# Patient Record
Sex: FEMALE | Race: WHITE | NOT HISPANIC OR LATINO | Employment: UNEMPLOYED | URBAN - METROPOLITAN AREA
[De-identification: names, ages, dates, MRNs, and addresses within clinical notes are randomized per-mention and may not be internally consistent; named-entity substitution may affect disease eponyms.]

---

## 2017-01-31 ENCOUNTER — HISTORICAL (OUTPATIENT)
Dept: RADIOLOGY | Facility: HOSPITAL | Age: 47
End: 2017-01-31

## 2017-02-03 ENCOUNTER — HISTORICAL (OUTPATIENT)
Dept: RADIOLOGY | Facility: HOSPITAL | Age: 47
End: 2017-02-03

## 2017-02-20 ENCOUNTER — HISTORICAL (OUTPATIENT)
Dept: RADIOLOGY | Facility: HOSPITAL | Age: 47
End: 2017-02-20

## 2017-03-15 ENCOUNTER — HISTORICAL (OUTPATIENT)
Dept: ADMINISTRATIVE | Facility: HOSPITAL | Age: 47
End: 2017-03-15

## 2017-03-16 ENCOUNTER — HISTORICAL (OUTPATIENT)
Dept: RADIOLOGY | Facility: HOSPITAL | Age: 47
End: 2017-03-16

## 2017-09-21 ENCOUNTER — HISTORICAL (OUTPATIENT)
Dept: RADIOLOGY | Facility: HOSPITAL | Age: 47
End: 2017-09-21

## 2017-10-26 ENCOUNTER — HISTORICAL (OUTPATIENT)
Dept: SURGERY | Facility: HOSPITAL | Age: 47
End: 2017-10-26

## 2017-11-21 ENCOUNTER — HISTORICAL (OUTPATIENT)
Dept: BARIATRICS | Facility: HOSPITAL | Age: 47
End: 2017-11-21

## 2017-11-27 ENCOUNTER — HISTORICAL (OUTPATIENT)
Dept: BARIATRICS | Facility: HOSPITAL | Age: 47
End: 2017-11-27

## 2017-11-27 ENCOUNTER — HISTORICAL (OUTPATIENT)
Dept: LAB | Facility: HOSPITAL | Age: 47
End: 2017-11-27

## 2017-11-27 LAB
ABS NEUT (OLG): 4.78 X10(3)/MCL (ref 2.1–9.2)
ALBUMIN SERPL-MCNC: 4.5 GM/DL (ref 3.4–5)
ALBUMIN/GLOB SERPL: 1.2 {RATIO}
ALP SERPL-CCNC: 139 UNIT/L (ref 38–126)
ALT SERPL-CCNC: 26 UNIT/L (ref 12–78)
APTT PPP: 29.8 SECOND(S) (ref 24.8–36.9)
AST SERPL-CCNC: 27 UNIT/L (ref 15–37)
BASOPHILS # BLD AUTO: 0 X10(3)/MCL (ref 0–0.2)
BASOPHILS NFR BLD AUTO: 1 %
BILIRUB SERPL-MCNC: 0.6 MG/DL (ref 0.2–1)
BILIRUBIN DIRECT+TOT PNL SERPL-MCNC: 0.1 MG/DL (ref 0–0.2)
BILIRUBIN DIRECT+TOT PNL SERPL-MCNC: 0.5 MG/DL (ref 0–0.8)
BUN SERPL-MCNC: 15 MG/DL (ref 7–18)
CALCIUM SERPL-MCNC: 9.7 MG/DL (ref 8.5–10.1)
CHLORIDE SERPL-SCNC: 101 MMOL/L (ref 98–107)
CO2 SERPL-SCNC: 27 MMOL/L (ref 21–32)
CREAT SERPL-MCNC: 0.71 MG/DL (ref 0.55–1.02)
EOSINOPHIL # BLD AUTO: 0.1 X10(3)/MCL (ref 0–0.9)
EOSINOPHIL NFR BLD AUTO: 1 %
ERYTHROCYTE [DISTWIDTH] IN BLOOD BY AUTOMATED COUNT: 14.6 % (ref 11.5–17)
GLOBULIN SER-MCNC: 3.8 GM/DL (ref 2.4–3.5)
GLUCOSE SERPL-MCNC: 79 MG/DL (ref 74–106)
HCT VFR BLD AUTO: 34.9 % (ref 37–47)
HGB BLD-MCNC: 10.6 GM/DL (ref 12–16)
LYMPHOCYTES # BLD AUTO: 1.8 X10(3)/MCL (ref 0.6–4.6)
LYMPHOCYTES NFR BLD AUTO: 24 %
MCH RBC QN AUTO: 23.4 PG (ref 27–31)
MCHC RBC AUTO-ENTMCNC: 30.4 GM/DL (ref 33–36)
MCV RBC AUTO: 77 FL (ref 80–94)
MONOCYTES # BLD AUTO: 0.8 X10(3)/MCL (ref 0.1–1.3)
MONOCYTES NFR BLD AUTO: 10 %
NEUTROPHILS # BLD AUTO: 4.78 X10(3)/MCL (ref 2.1–9.2)
NEUTROPHILS NFR BLD AUTO: 63 %
PLATELET # BLD AUTO: 266 X10(3)/MCL (ref 130–400)
PMV BLD AUTO: 10.2 FL (ref 9.4–12.4)
POTASSIUM SERPL-SCNC: 4.6 MMOL/L (ref 3.5–5.1)
PROT SERPL-MCNC: 8.3 GM/DL (ref 6.4–8.2)
RBC # BLD AUTO: 4.53 X10(6)/MCL (ref 4.2–5.4)
SODIUM SERPL-SCNC: 136 MMOL/L (ref 136–145)
WBC # SPEC AUTO: 7.5 X10(3)/MCL (ref 4.5–11.5)

## 2018-01-03 ENCOUNTER — HISTORICAL (OUTPATIENT)
Dept: BARIATRICS | Facility: HOSPITAL | Age: 48
End: 2018-01-03

## 2018-02-28 ENCOUNTER — HISTORICAL (OUTPATIENT)
Dept: BARIATRICS | Facility: HOSPITAL | Age: 48
End: 2018-02-28

## 2018-04-20 ENCOUNTER — HISTORICAL (OUTPATIENT)
Dept: BARIATRICS | Facility: HOSPITAL | Age: 48
End: 2018-04-20

## 2018-06-19 ENCOUNTER — HISTORICAL (OUTPATIENT)
Dept: BARIATRICS | Facility: HOSPITAL | Age: 48
End: 2018-06-19

## 2019-02-13 ENCOUNTER — HISTORICAL (OUTPATIENT)
Dept: BARIATRICS | Facility: HOSPITAL | Age: 49
End: 2019-02-13

## 2019-03-14 ENCOUNTER — HISTORICAL (OUTPATIENT)
Dept: SURGERY | Facility: HOSPITAL | Age: 49
End: 2019-03-14

## 2020-02-12 ENCOUNTER — HISTORICAL (OUTPATIENT)
Dept: BARIATRICS | Facility: HOSPITAL | Age: 50
End: 2020-02-12

## 2021-05-17 ENCOUNTER — HISTORICAL (OUTPATIENT)
Dept: RADIOLOGY | Facility: HOSPITAL | Age: 51
End: 2021-05-17

## 2021-10-28 ENCOUNTER — HISTORICAL (OUTPATIENT)
Dept: RADIOLOGY | Facility: HOSPITAL | Age: 51
End: 2021-10-28

## 2022-01-19 ENCOUNTER — HISTORICAL (OUTPATIENT)
Dept: RADIOLOGY | Facility: HOSPITAL | Age: 52
End: 2022-01-19

## 2022-01-25 ENCOUNTER — HISTORICAL (OUTPATIENT)
Dept: SURGERY | Facility: HOSPITAL | Age: 52
End: 2022-01-25

## 2022-01-26 ENCOUNTER — HISTORICAL (OUTPATIENT)
Dept: ANESTHESIOLOGY | Facility: HOSPITAL | Age: 52
End: 2022-01-26

## 2022-02-09 ENCOUNTER — HISTORICAL (OUTPATIENT)
Dept: ADMINISTRATIVE | Facility: HOSPITAL | Age: 52
End: 2022-02-09

## 2022-02-09 ENCOUNTER — HISTORICAL (OUTPATIENT)
Dept: RADIOLOGY | Facility: HOSPITAL | Age: 52
End: 2022-02-09

## 2022-03-30 ENCOUNTER — HISTORICAL (OUTPATIENT)
Dept: ADMINISTRATIVE | Facility: HOSPITAL | Age: 52
End: 2022-03-30

## 2022-03-30 ENCOUNTER — HISTORICAL (OUTPATIENT)
Dept: RADIOLOGY | Facility: HOSPITAL | Age: 52
End: 2022-03-30

## 2022-04-11 ENCOUNTER — HISTORICAL (OUTPATIENT)
Dept: ADMINISTRATIVE | Facility: HOSPITAL | Age: 52
End: 2022-04-11

## 2022-04-24 VITALS
SYSTOLIC BLOOD PRESSURE: 93 MMHG | BODY MASS INDEX: 27.28 KG/M2 | HEIGHT: 64 IN | DIASTOLIC BLOOD PRESSURE: 59 MMHG | WEIGHT: 159.81 LBS

## 2022-04-27 ENCOUNTER — HISTORICAL (OUTPATIENT)
Dept: ADMINISTRATIVE | Facility: HOSPITAL | Age: 52
End: 2022-04-27

## 2022-04-27 ENCOUNTER — HISTORICAL (OUTPATIENT)
Dept: RADIOLOGY | Facility: HOSPITAL | Age: 52
End: 2022-04-27

## 2022-04-30 NOTE — OP NOTE
Patient:   Jordan Gandara            MRN: [id number]            FIN: [id number]               Age:   46 years     Sex:  Female     :  1970   Associated Diagnoses:   None   Author:   Pal Casas MD        DATE OF SURGERY:    10/26/17    SURGEON:  Pal Casas MD    PREOPERATIVE DIAGNOSIS:  Gastroesophageal reflux disease.    POSTOPERATIVE DIAGNOSES:    Gastroesophageal reflux disease.     PROCEDURE:  Esophagogastroduodenoscopy with Biopsy.    ANESTHESIA:  IV Sedation    SPECIMENS REMOVED:  Antral Biopsy for bradly test    FINDINGS:  bradly test - Negative    INDICATIONS & SIGNIFICANT HISTORY:  The patient is a 46-year-old female who was being worked up for weight loss surgery.  The patient complained of gastroesophageal reflux disease.  Risks and benefits of elective endoscopy was discussed with the patient.  The patient voiced understanding of risks and benefits, and elected to proceed with surgery.    DESCRIPTION OF OPERATIVE PROCEDURE:  Once informed consents were obtained, the patient was taken to the Operating Room and placed supine on the operating table.  After MAC anesthesia was induced, the patient was then placed in left lateral decubitus position and the endoscope was used to enter the oropharynx down to the esophagus and into the stomach.  The stomach was then insufflated.  The scope was easily passed through the pylorus into the duodenum with no pathology appreciated.  The scope was then placed back into the stomach, and the antrum and body of the stomach appeared to have no significant pathology.  The scope was then retroflexed.  Upon visualization of the cardia and fundus, no pathology was appreciated.  The scope was then straightened.  An Antral biopsy was performed and tested for bradly with rapid bradly test.  No active bleeding was noted from biopsy site.  The insufflation was removed.  Upon visualization of the esophagus, no inflammatory process appreciated.  The Z-line appeared to  have no inflammatory changes.  The scope was then removed.  The patient tolerated the procedure well and was transported to Recovery Room in good condition.

## 2022-04-30 NOTE — OP NOTE
DATE OF SURGERY:    01/26/2022    SURGEON:  Britton Maya M.D.    PREOPERATIVE DIAGNOSIS:  Right distal radius fracture.    POSTOPERATIVE DIAGNOSIS:  Right distal radius fracture.    PROCEDURE:  Open reduction internal fixation right distal radius fracture.    IMPLANT TYPE:  Synthes volar plate locking.    INDICATION FOR PROCEDURE:  Ms. Gandara is 51, fell, sustained an intra-articular distal radius fracture.  She was brought to surgery, given a block by Anesthesia.  She is a chronic pain patient.  A volar incision was made.  The FCR tendon was retracted.  The fascia was incised beneath.  Flexor pollicis muscle was reflected off of the pronator.  The pronator was taken down sharply and the fracture was reduced.  It was secured with a volar locking plate.  Three screws were placed proximally, a cluster of locking pegs distally.  Reduction was satisfactory.  The wound was irrigated.  The pronator was closed with 2-0 Vicryl subcuticular with a running locking stitch.  Exofin glue on the skin.  Sterile dressing and volar splint applied.  No complications.        MAIDA/YASMIN   DD: 01/26/2022 1128   DT: 01/26/2022 1138  Job # [id number]

## 2022-04-30 NOTE — OP NOTE
Patient:   Jordan Gandara            MRN: [id number]            FIN: [id number]               Age:   48 years     Sex:  Female     :  1970   Associated Diagnoses:   None   Author:   Pal Casas MD        DATE OF SURGERY:    3/14/19    SURGEON:  Pal Casas MD    PREOPERATIVE DIAGNOSIS:  Epigastric Pain    POSTOPERATIVE DIAGNOSES:   1.  Yeast esophagitis        2.  Gastric Ulcer x 4      PROCEDURE:  Esophagogastroduodenoscopy with Antral Biopsy.    ANESTHESIA:  IV Sedation    SPECIMENS REMOVED:  Antral Biopsy for bradly test    FINDINGS:  As noted    INDICATIONS & SIGNIFICANT HISTORY:  The patient is a 48-year-old female who previously underwent sleeve gastrectomy for weight loss but started having some epigastric pain with meals.     Risks and benefits of elective endoscopy was discussed with the patient to evaluate for ulcer disease.  The patient voiced understanding of risks and benefits, and elected to proceed with surgery.    DESCRIPTION OF OPERATIVE PROCEDURE:  Once informed consents were obtained, the patient was taken to the Operating Room and placed supine on the operating table.  After MAC anesthesia was induced, the patient was then placed in left lateral decubitus position and the endoscope was used to enter the oropharynx down to the esophagus and into the stomach.  The esophagus appeared to have yeast coating the walls with some bile noted inthe stomach upon entry.  The stomach was then insufflated.  The scope was easily passed through the the stomach with sleeve anatomy appreciated.  There appeared to be 4 small non-bleeding ulcers noted along the greater curve (near the staple line).   The scope was then retroflexed.  Upon visualization of the cardia and fundus, no pathology was appreciated.  The scope was then straightened.  An Antral biopsy was performed and sent to pathology.  No active bleeding was noted from biopsy site.  The insufflation was removed.  Upon visualization  of the esophagus, no inflammatory process appreciated.  The Z-line appeared to have no inflammatory changes.  The scope was then removed.  The patient tolerated the procedure well and was transported to Recovery Room in good condition.

## 2023-04-12 ENCOUNTER — HOSPITAL ENCOUNTER (OUTPATIENT)
Dept: RADIOLOGY | Facility: HOSPITAL | Age: 53
Discharge: HOME | End: 2023-04-12
Attending: FAMILY MEDICINE
Payer: COMMERCIAL

## 2023-04-12 DIAGNOSIS — M25.562 PAIN IN LEFT KNEE: ICD-10-CM

## 2023-04-12 DIAGNOSIS — M25.562 LEFT KNEE PAIN: Primary | ICD-10-CM

## 2023-04-12 DIAGNOSIS — J06.9 ACUTE UPPER RESPIRATORY INFECTION: ICD-10-CM

## 2023-04-12 PROCEDURE — 71046 X-RAY EXAM CHEST 2 VIEWS: CPT | Mod: TC

## 2023-04-12 PROCEDURE — 73562 X-RAY EXAM OF KNEE 3: CPT | Mod: TC,LT

## 2023-04-13 ENCOUNTER — HOSPITAL ENCOUNTER (OUTPATIENT)
Dept: RADIOLOGY | Facility: HOSPITAL | Age: 53
Discharge: HOME | End: 2023-04-13
Attending: FAMILY MEDICINE
Payer: COMMERCIAL

## 2023-04-13 DIAGNOSIS — M25.562 PAIN IN LEFT KNEE: ICD-10-CM

## 2023-04-13 PROCEDURE — 93971 EXTREMITY STUDY: CPT | Mod: TC,LT

## 2023-04-14 DIAGNOSIS — M25.562 LEFT KNEE PAIN: Primary | ICD-10-CM

## 2023-05-09 RX ORDER — CYCLOBENZAPRINE HCL 10 MG
10 TABLET ORAL 3 TIMES DAILY
COMMUNITY
Start: 2023-03-23

## 2023-05-09 RX ORDER — DICYCLOMINE HYDROCHLORIDE 10 MG/1
10 CAPSULE ORAL
COMMUNITY
Start: 2023-03-27

## 2023-05-09 RX ORDER — CEVIMELINE HYDROCHLORIDE 30 MG/1
30 CAPSULE ORAL 3 TIMES DAILY
COMMUNITY
Start: 2023-04-16

## 2023-05-09 RX ORDER — TROSPIUM CHLORIDE 20 MG/1
20 TABLET, FILM COATED ORAL 2 TIMES DAILY
COMMUNITY
Start: 2023-02-28

## 2023-05-09 RX ORDER — PRAVASTATIN SODIUM 10 MG/1
10 TABLET ORAL NIGHTLY
COMMUNITY
Start: 2023-03-10

## 2023-05-09 RX ORDER — PANTOPRAZOLE SODIUM 40 MG/1
20 TABLET, DELAYED RELEASE ORAL 2 TIMES DAILY
COMMUNITY
Start: 2023-02-26

## 2023-05-09 RX ORDER — PROPRANOLOL HYDROCHLORIDE 20 MG/1
10 TABLET ORAL 2 TIMES DAILY
COMMUNITY
Start: 2023-03-07

## 2023-05-09 RX ORDER — SENNOSIDES 8.6 MG/1
17.2 TABLET ORAL NIGHTLY
COMMUNITY

## 2023-05-09 RX ORDER — DOCUSATE SODIUM 100 MG/1
100 CAPSULE, LIQUID FILLED ORAL 2 TIMES DAILY
COMMUNITY

## 2023-05-09 RX ORDER — HYDROCODONE BITARTRATE AND ACETAMINOPHEN 10; 325 MG/1; MG/1
1 TABLET ORAL EVERY 4 HOURS PRN
COMMUNITY
Start: 2023-03-23

## 2023-05-09 RX ORDER — METHADONE HYDROCHLORIDE 10 MG/1
10 TABLET ORAL 4 TIMES DAILY
COMMUNITY
Start: 2023-03-23

## 2023-05-09 RX ORDER — FUROSEMIDE 20 MG/1
20 TABLET ORAL EVERY MORNING
COMMUNITY
Start: 2023-04-17

## 2023-05-09 RX ORDER — LEVOTHYROXINE SODIUM 25 UG/1
25 TABLET ORAL
COMMUNITY
Start: 2023-02-13

## 2023-05-09 RX ORDER — BUSPIRONE HYDROCHLORIDE 30 MG/1
30 TABLET ORAL 2 TIMES DAILY
COMMUNITY
Start: 2023-03-23

## 2023-05-09 RX ORDER — ASPIRIN 81 MG/1
81 TABLET ORAL NIGHTLY
COMMUNITY

## 2023-05-09 RX ORDER — MULTIVITAMIN
1 TABLET ORAL 2 TIMES DAILY
COMMUNITY

## 2023-05-09 RX ORDER — ONDANSETRON 8 MG/1
8 TABLET, ORALLY DISINTEGRATING ORAL
COMMUNITY
Start: 2022-11-23

## 2023-05-09 RX ORDER — GABAPENTIN 800 MG/1
800 TABLET ORAL 3 TIMES DAILY
COMMUNITY
Start: 2023-03-23

## 2023-05-09 RX ORDER — TOPIRAMATE 50 MG/1
1 CAPSULE, EXTENDED RELEASE ORAL NIGHTLY
COMMUNITY
Start: 2023-03-27

## 2023-05-09 RX ORDER — SERTRALINE HYDROCHLORIDE 100 MG/1
100 TABLET, FILM COATED ORAL 2 TIMES DAILY
COMMUNITY
Start: 2023-03-23

## 2023-05-09 RX ORDER — PNV NO.95/FERROUS FUM/FOLIC AC 28MG-0.8MG
1 TABLET ORAL EVERY MORNING
COMMUNITY

## 2023-05-09 RX ORDER — CALCIUM CARBONATE 600 MG
600 TABLET ORAL 2 TIMES DAILY
COMMUNITY

## 2023-05-10 ENCOUNTER — ANESTHESIA EVENT (OUTPATIENT)
Dept: SURGERY | Facility: HOSPITAL | Age: 53
End: 2023-05-10
Payer: COMMERCIAL

## 2023-05-12 NOTE — H&P
"H&P Note will be under tab "Preprocedure Evaluation" Note completed by Anesthesiologist attending on day of MRI procedure.    "

## 2023-05-15 ENCOUNTER — HOSPITAL ENCOUNTER (OUTPATIENT)
Dept: RADIOLOGY | Facility: HOSPITAL | Age: 53
Discharge: HOME | End: 2023-05-15
Attending: FAMILY MEDICINE
Payer: COMMERCIAL

## 2023-05-15 ENCOUNTER — ANESTHESIA (OUTPATIENT)
Dept: SURGERY | Facility: HOSPITAL | Age: 53
End: 2023-05-15
Payer: COMMERCIAL

## 2023-05-15 ENCOUNTER — HOSPITAL ENCOUNTER (OUTPATIENT)
Facility: HOSPITAL | Age: 53
Discharge: HOME | End: 2023-05-15
Attending: ANESTHESIOLOGY | Admitting: ANESTHESIOLOGY
Payer: COMMERCIAL

## 2023-05-15 VITALS
HEIGHT: 64 IN | BODY MASS INDEX: 34.25 KG/M2 | OXYGEN SATURATION: 95 % | HEART RATE: 83 BPM | TEMPERATURE: 98 F | SYSTOLIC BLOOD PRESSURE: 124 MMHG | WEIGHT: 200.63 LBS | RESPIRATION RATE: 17 BRPM | DIASTOLIC BLOOD PRESSURE: 78 MMHG

## 2023-05-15 DIAGNOSIS — M25.562 LEFT KNEE PAIN: ICD-10-CM

## 2023-05-15 DIAGNOSIS — M25.562 LEFT KNEE PAIN, UNSPECIFIED CHRONICITY: ICD-10-CM

## 2023-05-15 LAB
B-HCG UR QL: NEGATIVE
CTP QC/QA: YES

## 2023-05-15 PROCEDURE — 63600175 PHARM REV CODE 636 W HCPCS: Performed by: NURSE ANESTHETIST, CERTIFIED REGISTERED

## 2023-05-15 PROCEDURE — D9220A PRA ANESTHESIA: Mod: CRNA,,, | Performed by: NURSE ANESTHETIST, CERTIFIED REGISTERED

## 2023-05-15 PROCEDURE — 25000003 PHARM REV CODE 250: Performed by: ANESTHESIOLOGY

## 2023-05-15 PROCEDURE — 81025 URINE PREGNANCY TEST: CPT | Performed by: ANESTHESIOLOGY

## 2023-05-15 PROCEDURE — 37000009 HC ANESTHESIA EA ADD 15 MINS: Performed by: ANESTHESIOLOGY

## 2023-05-15 PROCEDURE — D9220A PRA ANESTHESIA: ICD-10-PCS | Mod: ANES,,, | Performed by: ANESTHESIOLOGY

## 2023-05-15 PROCEDURE — 25000003 PHARM REV CODE 250: Performed by: NURSE ANESTHETIST, CERTIFIED REGISTERED

## 2023-05-15 PROCEDURE — D9220A PRA ANESTHESIA: ICD-10-PCS | Mod: CRNA,,, | Performed by: NURSE ANESTHETIST, CERTIFIED REGISTERED

## 2023-05-15 PROCEDURE — 37000008 HC ANESTHESIA 1ST 15 MINUTES: Performed by: ANESTHESIOLOGY

## 2023-05-15 PROCEDURE — 73721 MRI JNT OF LWR EXTRE W/O DYE: CPT | Mod: TC,LT

## 2023-05-15 PROCEDURE — D9220A PRA ANESTHESIA: Mod: ANES,,, | Performed by: ANESTHESIOLOGY

## 2023-05-15 RX ORDER — HYDROCODONE BITARTRATE AND ACETAMINOPHEN 10; 325 MG/1; MG/1
1 TABLET ORAL EVERY 6 HOURS PRN
Status: DISCONTINUED | OUTPATIENT
Start: 2023-05-15 | End: 2023-05-15 | Stop reason: HOSPADM

## 2023-05-15 RX ORDER — DIAZEPAM 5 MG/1
10 TABLET ORAL ONCE
Status: COMPLETED | OUTPATIENT
Start: 2023-05-15 | End: 2023-05-15

## 2023-05-15 RX ORDER — ONDANSETRON 2 MG/ML
INJECTION INTRAMUSCULAR; INTRAVENOUS
Status: DISCONTINUED | OUTPATIENT
Start: 2023-05-15 | End: 2023-05-15

## 2023-05-15 RX ORDER — SODIUM CHLORIDE, SODIUM GLUCONATE, SODIUM ACETATE, POTASSIUM CHLORIDE AND MAGNESIUM CHLORIDE 30; 37; 368; 526; 502 MG/100ML; MG/100ML; MG/100ML; MG/100ML; MG/100ML
INJECTION, SOLUTION INTRAVENOUS CONTINUOUS
Status: DISCONTINUED | OUTPATIENT
Start: 2023-05-15 | End: 2023-05-15 | Stop reason: HOSPADM

## 2023-05-15 RX ORDER — LIDOCAINE HYDROCHLORIDE 20 MG/ML
INJECTION, SOLUTION EPIDURAL; INFILTRATION; INTRACAUDAL; PERINEURAL
Status: DISCONTINUED | OUTPATIENT
Start: 2023-05-15 | End: 2023-05-15

## 2023-05-15 RX ORDER — DEXAMETHASONE SODIUM PHOSPHATE 4 MG/ML
INJECTION, SOLUTION INTRA-ARTICULAR; INTRALESIONAL; INTRAMUSCULAR; INTRAVENOUS; SOFT TISSUE
Status: DISCONTINUED | OUTPATIENT
Start: 2023-05-15 | End: 2023-05-15

## 2023-05-15 RX ORDER — LIDOCAINE HYDROCHLORIDE 10 MG/ML
1 INJECTION, SOLUTION EPIDURAL; INFILTRATION; INTRACAUDAL; PERINEURAL ONCE
Status: DISCONTINUED | OUTPATIENT
Start: 2023-05-15 | End: 2023-05-15 | Stop reason: HOSPADM

## 2023-05-15 RX ORDER — MIDAZOLAM HYDROCHLORIDE 1 MG/ML
INJECTION INTRAMUSCULAR; INTRAVENOUS
Status: DISCONTINUED | OUTPATIENT
Start: 2023-05-15 | End: 2023-05-15

## 2023-05-15 RX ORDER — PROPOFOL 10 MG/ML
INJECTION, EMULSION INTRAVENOUS
Status: DISCONTINUED | OUTPATIENT
Start: 2023-05-15 | End: 2023-05-15

## 2023-05-15 RX ORDER — ACETAMINOPHEN 10 MG/ML
1000 INJECTION, SOLUTION INTRAVENOUS ONCE
Status: CANCELLED | OUTPATIENT
Start: 2023-05-15 | End: 2023-05-15

## 2023-05-15 RX ORDER — DIPHENHYDRAMINE HYDROCHLORIDE 50 MG/ML
25 INJECTION INTRAMUSCULAR; INTRAVENOUS EVERY 6 HOURS PRN
Status: CANCELLED | OUTPATIENT
Start: 2023-05-15

## 2023-05-15 RX ORDER — ONDANSETRON 2 MG/ML
4 INJECTION INTRAMUSCULAR; INTRAVENOUS DAILY PRN
Status: CANCELLED | OUTPATIENT
Start: 2023-05-15

## 2023-05-15 RX ADMIN — DIAZEPAM 10 MG: 5 TABLET ORAL at 09:05

## 2023-05-15 RX ADMIN — HYDROCODONE BITARTRATE AND ACETAMINOPHEN 1 TABLET: 10; 325 TABLET ORAL at 09:05

## 2023-05-15 RX ADMIN — LIDOCAINE HYDROCHLORIDE 80 MG: 20 INJECTION, SOLUTION EPIDURAL; INFILTRATION; INTRACAUDAL; PERINEURAL at 12:05

## 2023-05-15 RX ADMIN — DEXAMETHASONE SODIUM PHOSPHATE 4 MG: 4 INJECTION, SOLUTION INTRA-ARTICULAR; INTRALESIONAL; INTRAMUSCULAR; INTRAVENOUS; SOFT TISSUE at 01:05

## 2023-05-15 RX ADMIN — ONDANSETRON 4 MG: 2 INJECTION INTRAMUSCULAR; INTRAVENOUS at 01:05

## 2023-05-15 RX ADMIN — MIDAZOLAM HYDROCHLORIDE 2 MG: 1 INJECTION, SOLUTION INTRAMUSCULAR; INTRAVENOUS at 12:05

## 2023-05-15 RX ADMIN — MIDAZOLAM HYDROCHLORIDE 3 MG: 1 INJECTION, SOLUTION INTRAMUSCULAR; INTRAVENOUS at 12:05

## 2023-05-15 RX ADMIN — PROPOFOL 150 MG: 10 INJECTION, EMULSION INTRAVENOUS at 12:05

## 2023-05-15 RX ADMIN — SODIUM CHLORIDE, SODIUM GLUCONATE, SODIUM ACETATE, POTASSIUM CHLORIDE AND MAGNESIUM CHLORIDE: 526; 502; 368; 37; 30 INJECTION, SOLUTION INTRAVENOUS at 12:05

## 2023-05-15 NOTE — ANESTHESIA POSTPROCEDURE EVALUATION
Anesthesia Post Evaluation    Patient: Jordan Gandara    Procedure(s) Performed: Procedure(s) (LRB):  MRI (Magnetic Resonance Imagine) (N/A)    Final Anesthesia Type: general      Patient location during evaluation: PACU  Patient participation: Yes- Able to Participate  Level of consciousness: oriented and awake  Post-procedure vital signs: reviewed and stable  Pain management: adequate  Airway patency: patent    PONV status at discharge: No PONV  Anesthetic complications: no      Cardiovascular status: hemodynamically stable  Respiratory status: spontaneous ventilation and unassisted  Hydration status: euvolemic  Follow-up not needed.  Comments: Merged with [facility]          Vitals Value Taken Time   /88 05/15/23 1341   Temp 36.2 °C (97.2 °F) 05/15/23 1315   Pulse 83 05/15/23 1344   Resp 15 05/15/23 1344   SpO2 91 % 05/15/23 1344   Vitals shown include unvalidated device data.      No case tracking events are documented in the log.      Pain/Susanna Score: Pain Rating Prior to Med Admin: 8 (5/15/2023  9:43 AM)  Susanna Score: 8 (5/15/2023  1:15 PM)

## 2023-05-15 NOTE — ANESTHESIA RELEASE NOTE
"Anesthesia Release from PACU Note    Patient: Jordan Gandara    Procedure(s) Performed: Procedure(s) (LRB):  MRI (Magnetic Resonance Imagine) (N/A)    Anesthesia type: general    Post pain: Adequate analgesia    Post assessment: no apparent anesthetic complications, tolerated procedure well and no evidence of recall    Last Vitals:   Visit Vitals  BP (!) 147/91 (BP Location: Left arm, Patient Position: Lying)   Pulse 91   Temp 36.4 °C (97.6 °F) (Oral)   Resp 16   Ht 5' 4" (1.626 m)   Wt 91 kg (200 lb 9.9 oz)   SpO2 97%   Breastfeeding No   BMI 34.44 kg/m²       Post vital signs: stable    Level of consciousness: awake, alert  and responds to stimulation    Nausea/Vomiting: no nausea/no vomiting    Complications: none    Airway Patency: patent    Respiratory: unassisted, spontaneous ventilation, nasal cannula    Cardiovascular: stable and blood pressure at baseline    Hydration: euvolemic  "

## 2023-05-15 NOTE — TRANSFER OF CARE
"Anesthesia Transfer of Care Note    Patient: Jordan Gandara    Procedure(s) Performed: Procedure(s) (LRB):  MRI (Magnetic Resonance Imagine) (N/A)    Patient location: PACU    Anesthesia Type: general    Transport from OR: Transported from OR on 2-3 L/min O2 by NC with adequate spontaneous ventilation    Post pain: adequate analgesia    Post assessment: no apparent anesthetic complications and tolerated procedure well    Post vital signs: stable    Level of consciousness: awake, alert and responds to stimulation    Nausea/Vomiting: no nausea/vomiting    Complications: none    Transfer of care protocol was followed      Last vitals:   Visit Vitals  BP (!) 147/91 (BP Location: Left arm, Patient Position: Lying)   Pulse 91   Temp 36.4 °C (97.6 °F) (Oral)   Resp 16   Ht 5' 4" (1.626 m)   Wt 91 kg (200 lb 9.9 oz)   SpO2 97%   Breastfeeding No   BMI 34.44 kg/m²     "

## 2023-05-15 NOTE — ANESTHESIA PREPROCEDURE EVALUATION
05/15/2023  Jordan Gandara is a 52 y.o. obese female with knee pain for MRI.  Unable to tolerate MRI without general anesthesia.  Chronic pain, opioid dependence, high anxiety, BRAULIO noted.  She is quite anxious in pre-op holding.      Pre-op Assessment    I have reviewed the Patient Summary Reports.     I have reviewed the Nursing Notes. I have reviewed the NPO Status.   I have reviewed the Medications.     Review of Systems  Anesthesia Hx:  No problems with previous Anesthesia  Denies Family Hx of Anesthesia complications.   Denies Personal Hx of Anesthesia complications.   Cardiovascular:  Cardiovascular Normal   Denies Angina.    Pulmonary:   Sleep Apnea    Hepatic/GI:   GERD    Endocrine:  Obesity / BMI > 30  Psych:   Psychiatric History          Physical Exam  General: Well nourished, Cooperative, Alert and Oriented    Airway:  Mallampati: II   Mouth Opening: Normal  TM Distance: Normal  Tongue: Normal  Neck ROM: Normal ROM    Dental:  Intact    Chest/Lungs:  Clear to auscultation, Normal Respiratory Rate    Heart:  Rate: Normal  Rhythm: Regular Rhythm        Anesthesia Plan  Type of Anesthesia, risks & benefits discussed:    Anesthesia Type: Gen Supraglottic Airway  Intra-op Monitoring Plan: Standard ASA Monitors  Post Op Pain Control Plan: multimodal analgesia  Induction:  IV  Airway Plan: , Post-Induction  Informed Consent: Informed consent signed with the Patient and all parties understand the risks and agree with anesthesia plan.  All questions answered.   ASA Score: 2  Day of Surgery Review of History & Physical: H&P Update referred to the surgeon/provider.    Ready For Surgery From Anesthesia Perspective.     .

## 2023-06-02 NOTE — DISCHARGE INSTRUCTIONS
-NO driving and NO alcohol consumption for 24 hours and while taking narcotic pain medications.      -Monitor sites for infection: redness, swelling, drainage/pus/foul odor, fever, chills.    -Report to your nearest ER if you experience and/or notify your provider if you experience any SUDDEN/SEVERE chest/abdominal pain, weakness, trouble breathing, uncontrolled pain.    Dr Jha ordered a year ago to see if helped her headaches and it did  Refill sent, I have seen her a couple of different times since.

## 2023-06-07 ENCOUNTER — ANESTHESIA EVENT (OUTPATIENT)
Dept: SURGERY | Facility: HOSPITAL | Age: 53
End: 2023-06-07
Payer: COMMERCIAL

## 2023-06-07 ENCOUNTER — HOSPITAL ENCOUNTER (OUTPATIENT)
Dept: RADIOLOGY | Facility: HOSPITAL | Age: 53
Discharge: HOME | End: 2023-06-07
Attending: SPECIALIST
Payer: COMMERCIAL

## 2023-06-07 ENCOUNTER — CLINICAL SUPPORT (OUTPATIENT)
Dept: RESPIRATORY THERAPY | Facility: HOSPITAL | Age: 53
End: 2023-06-07
Attending: SPECIALIST
Payer: COMMERCIAL

## 2023-06-07 DIAGNOSIS — M23.342 MENISCUS, LATERAL, ANTERIOR HORN DERANGEMENT, LEFT: Primary | ICD-10-CM

## 2023-06-07 DIAGNOSIS — Z01.811 PRE-OP CHEST EXAM: ICD-10-CM

## 2023-06-07 DIAGNOSIS — M23.342 MENISCUS, LATERAL, ANTERIOR HORN DERANGEMENT, LEFT: ICD-10-CM

## 2023-06-07 PROCEDURE — 93010 EKG 12-LEAD: ICD-10-PCS | Mod: ,,, | Performed by: INTERNAL MEDICINE

## 2023-06-07 PROCEDURE — 71046 X-RAY EXAM CHEST 2 VIEWS: CPT | Mod: TC

## 2023-06-07 PROCEDURE — 93010 ELECTROCARDIOGRAM REPORT: CPT | Mod: ,,, | Performed by: INTERNAL MEDICINE

## 2023-06-07 PROCEDURE — 93005 ELECTROCARDIOGRAM TRACING: CPT

## 2023-06-07 NOTE — ANESTHESIA PREPROCEDURE EVALUATION
06/07/2023  Jordan Gandara is a 52 y.o., female.      Pre-op Assessment    I have reviewed the Patient Summary Reports.     I have reviewed the Nursing Notes. I have reviewed the NPO Status.   I have reviewed the Medications.     Review of Systems  Anesthesia Hx:  Denies Family Hx of Anesthesia complications.   Denies Personal Hx of Anesthesia complications.   Social:  Non-Smoker, No Alcohol Use    Hematology/Oncology:  Hematology Normal        EENT/Dental:EENT/Dental Normal   Cardiovascular:   ECG has been reviewed.    Pulmonary:  Pulmonary Normal    Renal/:  Renal/ Normal     Hepatic/GI:   GERD, well controlled    Musculoskeletal:  Musculoskeletal Normal    Neurological:  Neurology Normal    Endocrine:  Endocrine Normal    Dermatological:  Skin Normal    Psych:   Psychiatric History          Physical Exam  General: Cooperative, Alert and Oriented  obesity  Airway:  Mallampati: II   Mouth Opening: Normal  TM Distance: Normal  Tongue: Normal  Neck ROM: Normal ROM    Dental:  Intact        Anesthesia Plan  Type of Anesthesia, risks & benefits discussed:    Anesthesia Type: Gen Supraglottic Airway  Intra-op Monitoring Plan: Standard ASA Monitors  Post Op Pain Control Plan: multimodal analgesia  Induction:  IV  Informed Consent: Informed consent signed with the Patient and all parties understand the risks and agree with anesthesia plan.  All questions answered. Patient consented to blood products? Yes  ASA Score: 1    Ready For Surgery From Anesthesia Perspective.     .

## 2023-06-07 NOTE — DISCHARGE INSTRUCTIONS
Nothing to eat or drink after midnight.       POST-OP INSTRUCTIONS FOR KNEE ARTHROSCOPY    Britton Marin M.D.     Orthopedic Surgery/Sports Medicine     [address], [facility]. [facility]     [place] [address] [id number]     Office: [phone]               REMOVE ACE BANDAGE AND DRESSING FROM KNEE 2 DAYS AFTER SURGERY, REMOVE ACE SOONER   IN CASE OF EXCESSIVE PAIN OR SWELLING OF LOWER EXTREMITY.    2. APPLY ICE FOR FIRST FEW DAYS AT 20 MINUTE INTERVALS.    3. USE CRUTCHES AS NEEDED. YOU MAY SLOWLY BEGIN TO PUT MORE WEIGHT   ON YOUR KNEE AS YOU FEEL COMFORTABLE TO DO SO.     4. AFTER THE 2 DAYS, WASH INCISION DAILY WITH ANTIBACTERIAL SOAP AND WATER   AND APPLY BANDAIDS OVER INCISION SITES.   WHEN INCISIONS STOP DRAINING YOU CAN LEAVE OPEN TO AIR.    5. REFRAIN FROM USING HYDROGEN PEROXIDE OR ANTIBIOTIC OINTMENT UNLESS INSTRUCTED TO DO SO.    6.  DO NOT SOAK INCISION IN BATHTUB.    7. CALL DR MARIN OR STAFF IN CASE OF REDNESS, EXCESSIVE DRAINAGE   OR INCREASE IN TEMPERATURE ABOVE 101.5.

## 2023-06-08 ENCOUNTER — HOSPITAL ENCOUNTER (OUTPATIENT)
Facility: HOSPITAL | Age: 53
Discharge: HOME | End: 2023-06-08
Attending: SPECIALIST | Admitting: SPECIALIST
Payer: COMMERCIAL

## 2023-06-08 ENCOUNTER — ANESTHESIA (OUTPATIENT)
Dept: SURGERY | Facility: HOSPITAL | Age: 53
End: 2023-06-08
Payer: COMMERCIAL

## 2023-06-08 VITALS
SYSTOLIC BLOOD PRESSURE: 120 MMHG | BODY MASS INDEX: 34.84 KG/M2 | DIASTOLIC BLOOD PRESSURE: 57 MMHG | WEIGHT: 203 LBS | RESPIRATION RATE: 14 BRPM | TEMPERATURE: 98 F | OXYGEN SATURATION: 96 % | HEART RATE: 67 BPM

## 2023-06-08 DIAGNOSIS — S83.282A ACUTE LATERAL MENISCUS TEAR OF LEFT KNEE: ICD-10-CM

## 2023-06-08 PROCEDURE — 63600175 PHARM REV CODE 636 W HCPCS: Performed by: SPECIALIST

## 2023-06-08 PROCEDURE — 36000711: Performed by: SPECIALIST

## 2023-06-08 PROCEDURE — 71000033 HC RECOVERY, INTIAL HOUR: Performed by: SPECIALIST

## 2023-06-08 PROCEDURE — 25000003 PHARM REV CODE 250: Performed by: SPECIALIST

## 2023-06-08 PROCEDURE — 27201423 OPTIME MED/SURG SUP & DEVICES STERILE SUPPLY: Performed by: SPECIALIST

## 2023-06-08 PROCEDURE — 36000710: Performed by: SPECIALIST

## 2023-06-08 PROCEDURE — D9220A PRA ANESTHESIA: ICD-10-PCS | Mod: ,,, | Performed by: NURSE ANESTHETIST, CERTIFIED REGISTERED

## 2023-06-08 PROCEDURE — 71000016 HC POSTOP RECOV ADDL HR: Performed by: SPECIALIST

## 2023-06-08 PROCEDURE — D9220A PRA ANESTHESIA: Mod: ,,, | Performed by: NURSE ANESTHETIST, CERTIFIED REGISTERED

## 2023-06-08 PROCEDURE — 63600175 PHARM REV CODE 636 W HCPCS: Performed by: NURSE ANESTHETIST, CERTIFIED REGISTERED

## 2023-06-08 PROCEDURE — 25000003 PHARM REV CODE 250: Performed by: NURSE ANESTHETIST, CERTIFIED REGISTERED

## 2023-06-08 PROCEDURE — 25000003 PHARM REV CODE 250: Performed by: ANESTHESIOLOGY

## 2023-06-08 PROCEDURE — 63600175 PHARM REV CODE 636 W HCPCS: Performed by: ANESTHESIOLOGY

## 2023-06-08 PROCEDURE — 71000015 HC POSTOP RECOV 1ST HR: Performed by: SPECIALIST

## 2023-06-08 PROCEDURE — 63600175 PHARM REV CODE 636 W HCPCS

## 2023-06-08 PROCEDURE — 37000009 HC ANESTHESIA EA ADD 15 MINS: Performed by: SPECIALIST

## 2023-06-08 PROCEDURE — 37000008 HC ANESTHESIA 1ST 15 MINUTES: Performed by: SPECIALIST

## 2023-06-08 RX ORDER — TRAMADOL HYDROCHLORIDE 50 MG/1
50 TABLET ORAL
Status: COMPLETED | OUTPATIENT
Start: 2023-06-08 | End: 2023-06-08

## 2023-06-08 RX ORDER — CEFAZOLIN SODIUM 2 G/50ML
2 SOLUTION INTRAVENOUS
Status: COMPLETED | OUTPATIENT
Start: 2023-06-08 | End: 2023-06-08

## 2023-06-08 RX ORDER — SODIUM CHLORIDE 0.9 % (FLUSH) 0.9 %
10 SYRINGE (ML) INJECTION
Status: DISCONTINUED | OUTPATIENT
Start: 2023-06-08 | End: 2023-06-08 | Stop reason: HOSPADM

## 2023-06-08 RX ORDER — CEFADROXIL 500 MG/1
500 CAPSULE ORAL EVERY 12 HOURS
Qty: 20 CAPSULE | Refills: 0 | Status: SHIPPED | OUTPATIENT
Start: 2023-06-08 | End: 2023-06-18

## 2023-06-08 RX ORDER — SODIUM CHLORIDE, SODIUM LACTATE, POTASSIUM CHLORIDE, CALCIUM CHLORIDE 600; 310; 30; 20 MG/100ML; MG/100ML; MG/100ML; MG/100ML
INJECTION, SOLUTION INTRAVENOUS CONTINUOUS
Status: DISCONTINUED | OUTPATIENT
Start: 2023-06-08 | End: 2023-06-08 | Stop reason: HOSPADM

## 2023-06-08 RX ORDER — TRIAMCINOLONE ACETONIDE 40 MG/ML
INJECTION, SUSPENSION INTRA-ARTICULAR; INTRAMUSCULAR
Status: DISCONTINUED | OUTPATIENT
Start: 2023-06-08 | End: 2023-06-08 | Stop reason: HOSPADM

## 2023-06-08 RX ORDER — FENTANYL CITRATE 50 UG/ML
INJECTION, SOLUTION INTRAMUSCULAR; INTRAVENOUS
Status: DISCONTINUED | OUTPATIENT
Start: 2023-06-08 | End: 2023-06-08

## 2023-06-08 RX ORDER — ONDANSETRON 2 MG/ML
INJECTION INTRAMUSCULAR; INTRAVENOUS
Status: DISCONTINUED | OUTPATIENT
Start: 2023-06-08 | End: 2023-06-08

## 2023-06-08 RX ORDER — DEXAMETHASONE SODIUM PHOSPHATE 4 MG/ML
INJECTION, SOLUTION INTRA-ARTICULAR; INTRALESIONAL; INTRAMUSCULAR; INTRAVENOUS; SOFT TISSUE
Status: DISCONTINUED | OUTPATIENT
Start: 2023-06-08 | End: 2023-06-08

## 2023-06-08 RX ORDER — BUPIVACAINE HYDROCHLORIDE 2.5 MG/ML
INJECTION, SOLUTION EPIDURAL; INFILTRATION; INTRACAUDAL
Status: DISCONTINUED | OUTPATIENT
Start: 2023-06-08 | End: 2023-06-08 | Stop reason: HOSPADM

## 2023-06-08 RX ORDER — HYDROCODONE BITARTRATE AND ACETAMINOPHEN 10; 325 MG/1; MG/1
1 TABLET ORAL EVERY 6 HOURS PRN
Qty: 28 TABLET | Refills: 0 | Status: SHIPPED | OUTPATIENT
Start: 2023-06-08

## 2023-06-08 RX ORDER — LIDOCAINE HYDROCHLORIDE 20 MG/ML
INJECTION INTRAVENOUS
Status: DISCONTINUED | OUTPATIENT
Start: 2023-06-08 | End: 2023-06-08

## 2023-06-08 RX ORDER — PROPOFOL 10 MG/ML
INJECTION, EMULSION INTRAVENOUS
Status: DISCONTINUED | OUTPATIENT
Start: 2023-06-08 | End: 2023-06-08

## 2023-06-08 RX ORDER — ACETAMINOPHEN 500 MG
1000 TABLET ORAL
Status: COMPLETED | OUTPATIENT
Start: 2023-06-08 | End: 2023-06-08

## 2023-06-08 RX ORDER — HYDROMORPHONE HYDROCHLORIDE 2 MG/ML
1 INJECTION, SOLUTION INTRAMUSCULAR; INTRAVENOUS; SUBCUTANEOUS
Status: COMPLETED | OUTPATIENT
Start: 2023-06-08 | End: 2023-06-08

## 2023-06-08 RX ORDER — MIDAZOLAM HYDROCHLORIDE 1 MG/ML
INJECTION INTRAMUSCULAR; INTRAVENOUS
Status: DISCONTINUED | OUTPATIENT
Start: 2023-06-08 | End: 2023-06-08

## 2023-06-08 RX ORDER — DIAZEPAM 5 MG/1
10 TABLET ORAL
Status: COMPLETED | OUTPATIENT
Start: 2023-06-08 | End: 2023-06-08

## 2023-06-08 RX ORDER — GABAPENTIN 300 MG/1
600 CAPSULE ORAL
Status: COMPLETED | OUTPATIENT
Start: 2023-06-08 | End: 2023-06-08

## 2023-06-08 RX ADMIN — ACETAMINOPHEN 1000 MG: 500 TABLET, FILM COATED ORAL at 07:06

## 2023-06-08 RX ADMIN — HYDROMORPHONE HYDROCHLORIDE 1 MG: 2 INJECTION, SOLUTION INTRAMUSCULAR; INTRAVENOUS; SUBCUTANEOUS at 07:06

## 2023-06-08 RX ADMIN — LIDOCAINE HYDROCHLORIDE 100 MG: 20 INJECTION, SOLUTION INTRAVENOUS at 08:06

## 2023-06-08 RX ADMIN — CEFAZOLIN SODIUM 2 G: 2 SOLUTION INTRAVENOUS at 08:06

## 2023-06-08 RX ADMIN — ONDANSETRON 4 MG: 2 INJECTION INTRAMUSCULAR; INTRAVENOUS at 08:06

## 2023-06-08 RX ADMIN — MIDAZOLAM HYDROCHLORIDE 2 MG: 1 INJECTION INTRAMUSCULAR; INTRAVENOUS at 07:06

## 2023-06-08 RX ADMIN — FENTANYL CITRATE 50 MCG: 50 INJECTION, SOLUTION INTRAMUSCULAR; INTRAVENOUS at 07:06

## 2023-06-08 RX ADMIN — TRAMADOL HYDROCHLORIDE 50 MG: 50 TABLET, FILM COATED ORAL at 07:06

## 2023-06-08 RX ADMIN — DEXAMETHASONE SODIUM PHOSPHATE 4 MG: 4 INJECTION, SOLUTION INTRA-ARTICULAR; INTRALESIONAL; INTRAMUSCULAR; INTRAVENOUS; SOFT TISSUE at 08:06

## 2023-06-08 RX ADMIN — PROPOFOL 120 MG: 10 INJECTION, EMULSION INTRAVENOUS at 08:06

## 2023-06-08 RX ADMIN — GABAPENTIN 600 MG: 300 CAPSULE ORAL at 07:06

## 2023-06-08 RX ADMIN — SODIUM CHLORIDE, POTASSIUM CHLORIDE, SODIUM LACTATE AND CALCIUM CHLORIDE: 600; 310; 30; 20 INJECTION, SOLUTION INTRAVENOUS at 08:06

## 2023-06-08 RX ADMIN — DIAZEPAM 10 MG: 5 TABLET ORAL at 07:06

## 2023-06-08 RX ADMIN — SODIUM CHLORIDE, POTASSIUM CHLORIDE, SODIUM LACTATE AND CALCIUM CHLORIDE: 600; 310; 30; 20 INJECTION, SOLUTION INTRAVENOUS at 07:06

## 2023-06-08 NOTE — HOSPITAL COURSE
Patient underwent arthroscopic partial lateral meniscectomy for a torn discoid lateral meniscus.  When tolerating p.o. she will be discharged home

## 2023-06-08 NOTE — PATIENT INSTRUCTIONS
Patient needs to be instructed to use her cryo Cuff 2 hours in the morning and 2 hours in the afternoon.  She can weightbear as tolerated to the left knee.  Dressing change in 3 days.

## 2023-06-08 NOTE — DISCHARGE SUMMARY
[facility] - Periop Services  Discharge Note  Short Stay    Procedure(s) (LRB):  ARTHROSCOPY, KNEE, WITH MENISCECTOMY (LMT) (Left)      OUTCOME: Condition has improved and patient is now ready for discharge.    DISPOSITION: Home or Self Care    FINAL DIAGNOSIS:  <principal problem not specified>    FOLLOWUP: In clinic    DISCHARGE INSTRUCTIONS:  No discharge procedures on file.     TIME SPENT ON DISCHARGE: 25 minutes

## 2023-06-08 NOTE — TRANSFER OF CARE
Anesthesia Transfer of Care Note    Patient: Jordan Gandara    Procedure(s) Performed: Procedure(s) (LRB):  ARTHROSCOPY, KNEE, WITH MENISCECTOMY (LMT) (Left)    Patient location: PACU    Anesthesia Type: general    Transport from OR: Transported from OR on room air with adequate spontaneous ventilation    Post pain: adequate analgesia    Post assessment: no apparent anesthetic complications    Post vital signs: stable    Level of consciousness: responds to stimulation and sedated    Nausea/Vomiting: no nausea/vomiting    Complications: none    Transfer of care protocol was followed      Last vitals:   Visit Vitals  /65   Pulse 75   Temp 36.6 °C (97.8 °F) (Oral)   Resp 20   Wt 92.1 kg (203 lb)   LMP 05/15/2023   SpO2 (!) 93%   Breastfeeding No   BMI 34.84 kg/m²

## 2023-06-08 NOTE — DISCHARGE SUMMARY
[facility] - Peri Services  Orthopedics  Discharge Summary      Patient Name: Jordan Gandara  MRN: [id number]  Admission Date: 6/8/2023  Hospital Length of Stay: 0 days  Discharge Date and Time:  06/08/2023 8:59 AM  Attending Physician: Britton Maya MD   Discharging Provider: Britton Maya MD  Primary Care Provider: Hanna Benitez MD    HPI:   No notes on file    Procedure(s) (LRB):  ARTHROSCOPY, KNEE, WITH MENISCECTOMY (LMT) (Left)      Hospital Course:  Patient underwent arthroscopic partial lateral meniscectomy for a torn discoid lateral meniscus.  When tolerating p.o. she will be discharged home      Goals of Care Treatment Preferences:             Significant Diagnostic Studies: none    Pending Diagnostic Studies:     None        Final Active Diagnoses:    Diagnosis Date Noted POA    PRINCIPAL PROBLEM:  Acute lateral meniscus tear of left knee [S83.282A] 06/08/2023 Yes      Problems Resolved During this Admission:      Discharged Condition: stable    Disposition: Home or Self Care    Follow Up:   Follow-up Information     Britton Maya MD. Schedule an appointment as soon as possible for a visit on 6/14/2023.    Specialty: Orthopedic Surgery  Contact information:  [address] [place] [address] [id number] [phone]                       Patient Instructions:   No discharge procedures on file.  Medications:  Reconciled Home Medications:      Medication List      START taking these medications    cefadroxil 500 MG Cap  Commonly known as: DURICEF  Take 1 capsule (500 mg total) by mouth every 12 (twelve) hours. for 10 days        CHANGE how you take these medications    * HYDROcodone-acetaminophen  mg per tablet  Commonly known as: NORCO  1 tablet every 4 (four) hours as needed.  What changed: Another medication with the same name was added. Make sure you understand how and when to take each.     * HYDROcodone-acetaminophen  mg per tablet  Commonly known as: NORCO  Take 1  tablet by mouth every 6 (six) hours as needed for Pain.  What changed: You were already taking a medication with the same name, and this prescription was added. Make sure you understand how and when to take each.         * This list has 2 medication(s) that are the same as other medications prescribed for you. Read the directions carefully, and ask your doctor or other care provider to review them with you.            CONTINUE taking these medications    aspirin 81 MG EC tablet  Commonly known as: ECOTRIN  Take 81 mg by mouth every evening. Stopped 6/7/23     biotin-keratin 10,000-100 mcg-mg Tab  Take 1 capsule by mouth every morning.     busPIRone 30 MG Tab  Commonly known as: BUSPAR  Take 30 mg by mouth 2 (two) times daily.     calcium carbonate 600 mg calcium (1,500 mg) Tab  Commonly known as: OS-HANNAH  Take 600 mg by mouth 2 (two) times a day.     cevimeline 30 mg capsule  Commonly known as: EVOXAC  Take 30 mg by mouth 3 (three) times daily.     cyanocobalamin 100 MCG tablet  Commonly known as: VITAMIN B-12  Take 1 capsule by mouth every morning.     cyclobenzaprine 10 MG tablet  Commonly known as: FLEXERIL  Take 10 mg by mouth 3 (three) times daily.     dicyclomine 10 MG capsule  Commonly known as: BENTYL  Take 10 mg by mouth as needed.     docusate sodium 100 MG capsule  Commonly known as: COLACE  Take 100 mg by mouth 2 (two) times daily.     furosemide 20 MG tablet  Commonly known as: LASIX  Take 20 mg by mouth every morning.     gabapentin 800 MG tablet  Commonly known as: NEURONTIN  Take 800 mg by mouth 3 (three) times daily.     levothyroxine 25 MCG tablet  Commonly known as: SYNTHROID  Take 25 mcg by mouth before breakfast.     methadone 10 MG tablet  Commonly known as: DOLOPHINE  Take 10 mg by mouth 4 (four) times daily.     multivitamin per tablet  Commonly known as: THERAGRAN  Take 1 tablet by mouth 2 (two) times a day.     ondansetron 8 MG Tbdl  Commonly known as: ZOFRAN-ODT  8 mg as needed.      pantoprazole 40 MG tablet  Commonly known as: PROTONIX  Take 20 mg by mouth 2 (two) times a day.     pravastatin 10 MG tablet  Commonly known as: PRAVACHOL  Take 10 mg by mouth every evening.     propranoloL 20 MG tablet  Commonly known as: INDERAL  Take 10 mg by mouth 2 (two) times daily.     senna 8.6 mg tablet  Commonly known as: SENOKOT  Take 17.2 mg by mouth every evening.     sertraline 100 MG tablet  Commonly known as: ZOLOFT  Take 100 mg by mouth 2 (two) times a day.     topiramate 50 mg Cp24  Commonly known as: TROKENDI XR  Take 1 capsule by mouth every evening.     trospium 20 mg Tab tablet  Commonly known as: sanctura  Take 20 mg by mouth 2 (two) times daily.            Britton Maya MD  Orthopedics  [facility] - Periop Services

## 2023-06-08 NOTE — ANESTHESIA POSTPROCEDURE EVALUATION
Anesthesia Post Evaluation    Patient: Jordan Gandara    Procedure(s) Performed: Procedure(s) (LRB):  ARTHROSCOPY, KNEE, WITH MENISCECTOMY (LMT) (Left)    Final Anesthesia Type: general      Patient location during evaluation: PACU  Patient participation: Yes- Able to Participate  Level of consciousness: awake  Post-procedure vital signs: reviewed and stable  Pain management: adequate  Airway patency: patent  BRAULIO mitigation strategies: Multimodal analgesia  PONV status at discharge: No PONV  Anesthetic complications: no      Cardiovascular status: hemodynamically stable  Respiratory status: unassisted, room air and spontaneous ventilation  Hydration status: euvolemic  Follow-up not needed.          Vitals Value Taken Time   /86 06/08/23 0910   Temp 36.4 °C (97.5 °F) 06/08/23 0905   Pulse 75 06/08/23 0913   Resp 10 06/08/23 0913   SpO2 96 % 06/08/23 0913   Vitals shown include unvalidated device data.      No case tracking events are documented in the log.      Pain/Susanna Score: Pain Rating Prior to Med Admin: 8 (6/8/2023  7:12 AM)  Susanna Score: 5 (6/8/2023  9:04 AM)

## 2023-06-08 NOTE — OP NOTE
Operative note     Date: 6/8/2023     Preop diagnosis:  Lateral meniscal tear left knee    Postop diagnosis torn discoid lateral meniscus left knee    Procedure:  Arthroscopic partial lateral meniscectomy    Surgeon: Britton Maya M.D.    Assistant: .    Anesthesia:  General    Complications: none    Blood loss: nil    Procedure in detail:  Informed consent was obtained.  Risks of the procedure was explained not excluding infection, bleeding pain, scarring, neurovascular injury, arthritis.  This patient was 52 with persistent knee pain despite conservative management.  Her MRI indicated extruded torn lateral meniscus.  She was brought to the OR given IV antibiotics and general anesthesia.  Inflow was established and the knee was examined arthroscopically.  She would a moderate effusion.  Her medial joint line was pristine her anterior and posterior cruciate ligaments were intact.  Patellofemoral had some synovitis but no significant arthritic disease.  She would a large discoid meniscus laterally with extruded tear anteriorly as well as a posterior tear.  I resected the anterior portion of the discoid meniscus was torn and used a motorized shaver posteriorly for debridement to a stable rim.  She did have some chondral scuffing.  The knee was irrigated.  The portals were closed with nylon sterile dressing applied.  No complications    Britton Maya M.D.

## 2023-06-08 NOTE — ANESTHESIA PROCEDURE NOTES
IGEL 4 Insertion    Date/Time: 6/8/2023 8:04 AM  Performed by: Terri Hamilton CRNA  Authorized by: Sandro Ortega DO     Intubation:     Induction:  Intravenous    Mask Ventilation:  N/a    Attempts:  1    Attempted By:  CRNA    Difficult Airway Encountered?: No      Complications:  None    Airway Device:  Supraglottic airway/LMA (IGEL)    Airway Device Size:  4.0    Style/Cuff Inflation:  Uncuffed    Placement Verified By:  Capnometry    Complicating Factors:  None    Findings Post-Intubation:  BS equal bilateral and atraumatic/condition of teeth unchanged

## 2023-06-23 ENCOUNTER — HOSPITAL ENCOUNTER (OUTPATIENT)
Dept: RADIOLOGY | Facility: HOSPITAL | Age: 53
Discharge: HOME | End: 2023-06-23
Attending: FAMILY MEDICINE
Payer: COMMERCIAL

## 2023-06-23 DIAGNOSIS — M25.511 RIGHT SHOULDER PAIN: ICD-10-CM

## 2023-06-23 PROCEDURE — 73030 X-RAY EXAM OF SHOULDER: CPT | Mod: TC,RT

## 2023-11-24 ENCOUNTER — TELEPHONE (OUTPATIENT)
Dept: SURGERY | Facility: CLINIC | Age: 53
End: 2023-11-24
Payer: COMMERCIAL

## 2023-12-04 ENCOUNTER — TELEPHONE (OUTPATIENT)
Dept: SURGERY | Facility: CLINIC | Age: 53
End: 2023-12-04
Payer: COMMERCIAL

## 2024-05-14 ENCOUNTER — HOSPITAL ENCOUNTER (OUTPATIENT)
Dept: RADIOLOGY | Facility: HOSPITAL | Age: 54
Discharge: HOME | End: 2024-05-14
Attending: FAMILY MEDICINE
Payer: COMMERCIAL

## 2024-05-14 DIAGNOSIS — R05.1 ACUTE COUGH: ICD-10-CM

## 2024-05-14 PROCEDURE — 71046 X-RAY EXAM CHEST 2 VIEWS: CPT | Mod: TC

## 2024-05-15 DIAGNOSIS — R10.84 ABDOMINAL PAIN, GENERALIZED: Primary | ICD-10-CM

## 2024-06-05 ENCOUNTER — HOSPITAL ENCOUNTER (OUTPATIENT)
Dept: RADIOLOGY | Facility: HOSPITAL | Age: 54
Discharge: HOME | End: 2024-06-05
Attending: FAMILY MEDICINE
Payer: COMMERCIAL

## 2024-06-05 DIAGNOSIS — K59.00 CONSTIPATED: ICD-10-CM

## 2024-06-05 DIAGNOSIS — R10.84 ABDOMINAL PAIN, GENERALIZED: ICD-10-CM

## 2024-06-07 ENCOUNTER — HOSPITAL ENCOUNTER (OUTPATIENT)
Dept: RADIOLOGY | Facility: HOSPITAL | Age: 54
Discharge: HOME | End: 2024-06-07
Attending: FAMILY MEDICINE
Payer: COMMERCIAL

## 2024-06-07 PROCEDURE — 74022 RADEX COMPL AQT ABD SERIES: CPT | Mod: TC

## 2024-08-14 ENCOUNTER — HOSPITAL ENCOUNTER (INPATIENT)
Facility: HOSPITAL | Age: 54
LOS: 3 days | Discharge: HOME | DRG: 880 | End: 2024-08-17
Attending: EMERGENCY MEDICINE | Admitting: INTERNAL MEDICINE
Payer: COMMERCIAL

## 2024-08-14 DIAGNOSIS — R25.1 EPISODE OF SHAKING: ICD-10-CM

## 2024-08-14 DIAGNOSIS — R07.9 CHEST PAIN: ICD-10-CM

## 2024-08-14 DIAGNOSIS — G40.909 SEIZURE DISORDER: ICD-10-CM

## 2024-08-14 DIAGNOSIS — R90.89 ABNORMAL CT OF BRAIN: ICD-10-CM

## 2024-08-14 DIAGNOSIS — R56.9 SEIZURE-LIKE ACTIVITY: Primary | ICD-10-CM

## 2024-08-14 LAB
ALBUMIN SERPL-MCNC: 4 G/DL (ref 3.5–5)
ALBUMIN/GLOB SERPL: 1.1 RATIO (ref 1.1–2)
ALP SERPL-CCNC: 163 UNIT/L (ref 40–150)
ALT SERPL-CCNC: 21 UNIT/L (ref 0–55)
AMPHET UR QL SCN: NEGATIVE
ANION GAP SERPL CALC-SCNC: 15 MEQ/L
AST SERPL-CCNC: 28 UNIT/L (ref 5–34)
B-HCG UR QL: NEGATIVE
BACTERIA #/AREA URNS AUTO: ABNORMAL /HPF
BARBITURATE SCN PRESENT UR: NEGATIVE
BASOPHILS # BLD AUTO: 0.06 X10(3)/MCL
BASOPHILS NFR BLD AUTO: 0.5 %
BENZODIAZ UR QL SCN: NEGATIVE
BILIRUB SERPL-MCNC: 0.4 MG/DL
BILIRUB UR QL STRIP.AUTO: NEGATIVE
BUN SERPL-MCNC: 24.1 MG/DL (ref 9.8–20.1)
CALCIUM SERPL-MCNC: 9.2 MG/DL (ref 8.4–10.2)
CANNABINOIDS UR QL SCN: NEGATIVE
CHLORIDE SERPL-SCNC: 103 MMOL/L (ref 98–107)
CLARITY UR: ABNORMAL
CO2 SERPL-SCNC: 21 MMOL/L (ref 22–29)
COCAINE UR QL SCN: NEGATIVE
COLOR UR AUTO: ABNORMAL
CREAT SERPL-MCNC: 2.37 MG/DL (ref 0.55–1.02)
CREAT/UREA NIT SERPL: 10
EOSINOPHIL # BLD AUTO: 0.15 X10(3)/MCL (ref 0–0.9)
EOSINOPHIL NFR BLD AUTO: 1.4 %
ERYTHROCYTE [DISTWIDTH] IN BLOOD BY AUTOMATED COUNT: 13.9 % (ref 11.5–17)
FENTANYL UR QL SCN: NEGATIVE
GFR SERPLBLD CREATININE-BSD FMLA CKD-EPI: 24 ML/MIN/1.73/M2
GLOBULIN SER-MCNC: 3.7 GM/DL (ref 2.4–3.5)
GLUCOSE SERPL-MCNC: 99 MG/DL (ref 74–100)
GLUCOSE UR QL STRIP: ABNORMAL
HCT VFR BLD AUTO: 33.9 % (ref 37–47)
HGB BLD-MCNC: 11 G/DL (ref 12–16)
HGB UR QL STRIP: ABNORMAL
IMM GRANULOCYTES # BLD AUTO: 0.03 X10(3)/MCL (ref 0–0.04)
IMM GRANULOCYTES NFR BLD AUTO: 0.3 %
KETONES UR QL STRIP: NEGATIVE
LEUKOCYTE ESTERASE UR QL STRIP: NEGATIVE
LYMPHOCYTES # BLD AUTO: 1.65 X10(3)/MCL (ref 0.6–4.6)
LYMPHOCYTES NFR BLD AUTO: 15.1 %
MCH RBC QN AUTO: 27.2 PG (ref 27–31)
MCHC RBC AUTO-ENTMCNC: 32.4 G/DL (ref 33–36)
MCV RBC AUTO: 83.7 FL (ref 80–94)
MDMA UR QL SCN: NEGATIVE
MONOCYTES # BLD AUTO: 1.06 X10(3)/MCL (ref 0.1–1.3)
MONOCYTES NFR BLD AUTO: 9.7 %
MUCOUS THREADS URNS QL MICRO: ABNORMAL /LPF
NEUTROPHILS # BLD AUTO: 7.97 X10(3)/MCL (ref 2.1–9.2)
NEUTROPHILS NFR BLD AUTO: 73 %
NITRITE UR QL STRIP: NEGATIVE
NRBC BLD AUTO-RTO: 0 %
OPIATES UR QL SCN: POSITIVE
PCP UR QL: NEGATIVE
PH UR STRIP: 5.5 [PH]
PH UR: 5.5 [PH] (ref 3–11)
PLATELET # BLD AUTO: 175 X10(3)/MCL (ref 130–400)
PLATELETS.RETICULATED NFR BLD AUTO: 1.8 % (ref 0.9–11.2)
PMV BLD AUTO: 9.5 FL (ref 7.4–10.4)
POTASSIUM SERPL-SCNC: 3.8 MMOL/L (ref 3.5–5.1)
PROT SERPL-MCNC: 7.7 GM/DL (ref 6.4–8.3)
PROT UR QL STRIP: ABNORMAL
RBC # BLD AUTO: 4.05 X10(6)/MCL (ref 4.2–5.4)
RBC #/AREA URNS AUTO: ABNORMAL /HPF
SODIUM SERPL-SCNC: 139 MMOL/L (ref 136–145)
SP GR UR STRIP.AUTO: 1.01 (ref 1–1.03)
SPECIFIC GRAVITY, URINE AUTO (.000) (OHS): 1.01 (ref 1–1.03)
SQUAMOUS #/AREA URNS LPF: ABNORMAL /HPF
UROBILINOGEN UR STRIP-ACNC: NORMAL
WBC # BLD AUTO: 10.92 X10(3)/MCL (ref 4.5–11.5)
WBC #/AREA URNS AUTO: ABNORMAL /HPF

## 2024-08-14 PROCEDURE — 96361 HYDRATE IV INFUSION ADD-ON: CPT

## 2024-08-14 PROCEDURE — 85025 COMPLETE CBC W/AUTO DIFF WBC: CPT | Performed by: EMERGENCY MEDICINE

## 2024-08-14 PROCEDURE — 96365 THER/PROPH/DIAG IV INF INIT: CPT

## 2024-08-14 PROCEDURE — 96375 TX/PRO/DX INJ NEW DRUG ADDON: CPT

## 2024-08-14 PROCEDURE — 99285 EMERGENCY DEPT VISIT HI MDM: CPT | Mod: 25

## 2024-08-14 PROCEDURE — 25000003 PHARM REV CODE 250: Performed by: EMERGENCY MEDICINE

## 2024-08-14 PROCEDURE — 81001 URINALYSIS AUTO W/SCOPE: CPT | Mod: XB

## 2024-08-14 PROCEDURE — 81025 URINE PREGNANCY TEST: CPT

## 2024-08-14 PROCEDURE — 63600175 PHARM REV CODE 636 W HCPCS: Performed by: EMERGENCY MEDICINE

## 2024-08-14 PROCEDURE — 87086 URINE CULTURE/COLONY COUNT: CPT

## 2024-08-14 PROCEDURE — 11000001 HC ACUTE MED/SURG PRIVATE ROOM

## 2024-08-14 PROCEDURE — 80307 DRUG TEST PRSMV CHEM ANLYZR: CPT | Performed by: EMERGENCY MEDICINE

## 2024-08-14 PROCEDURE — 80053 COMPREHEN METABOLIC PANEL: CPT | Performed by: EMERGENCY MEDICINE

## 2024-08-14 RX ORDER — LEVETIRACETAM 10 MG/ML
1000 INJECTION INTRAVASCULAR
Status: COMPLETED | OUTPATIENT
Start: 2024-08-14 | End: 2024-08-14

## 2024-08-14 RX ORDER — DROPERIDOL 2.5 MG/ML
0.62 INJECTION, SOLUTION INTRAMUSCULAR; INTRAVENOUS
Status: COMPLETED | OUTPATIENT
Start: 2024-08-14 | End: 2024-08-14

## 2024-08-14 RX ORDER — DIAZEPAM 2 MG/1
2 TABLET ORAL
Status: COMPLETED | OUTPATIENT
Start: 2024-08-14 | End: 2024-08-14

## 2024-08-14 RX ADMIN — DROPERIDOL 0.62 MG: 2.5 INJECTION, SOLUTION INTRAMUSCULAR; INTRAVENOUS at 07:08

## 2024-08-14 RX ADMIN — SODIUM CHLORIDE, POTASSIUM CHLORIDE, SODIUM LACTATE AND CALCIUM CHLORIDE 1000 ML: 600; 310; 30; 20 INJECTION, SOLUTION INTRAVENOUS at 07:08

## 2024-08-14 RX ADMIN — LEVETIRACETAM INJECTION 1000 MG: 10 INJECTION INTRAVENOUS at 09:08

## 2024-08-14 RX ADMIN — DIAZEPAM 2 MG: 2 TABLET ORAL at 09:08

## 2024-08-14 NOTE — FIRST PROVIDER EVALUATION
"Medical screening examination initiated.  I have conducted a focused provider triage encounter, findings are as follows:    Brief history of present illness:  arrived to Ed due to body shakes that began on yesterday. Seen at [facility] prior to arrival and labs performed. Cr elevated. Takes propanolol for tremors. Also reports she had an episode of slurred speech yesterday morning.     Vitals:    08/14/24 1727   BP: 118/65   BP Location: Left arm   Pulse: 98   Resp: (!) 22   Temp: 98.3 °F (36.8 °C)   TempSrc: Oral   SpO2: (!) 94%   Weight: 113.4 kg (250 lb)   Height: 5' 4" (1.626 m)       Pertinent physical exam:  awake, alert, has non-labored breathing, tremors noted in triage    Brief workup plan:  imaging     Preliminary workup initiated; this workup will be continued and followed by the physician or advanced practice provider that is assigned to the patient when roomed.  "

## 2024-08-15 PROBLEM — R56.9 SEIZURE-LIKE ACTIVITY: Status: ACTIVE | Noted: 2024-08-15

## 2024-08-15 LAB
ALBUMIN SERPL-MCNC: 3.6 G/DL (ref 3.5–5)
ALBUMIN/GLOB SERPL: 1.3 RATIO (ref 1.1–2)
ALP SERPL-CCNC: 150 UNIT/L (ref 40–150)
ALT SERPL-CCNC: 17 UNIT/L (ref 0–55)
ANION GAP SERPL CALC-SCNC: 13 MEQ/L
AST SERPL-CCNC: 23 UNIT/L (ref 5–34)
BASOPHILS # BLD AUTO: 0.04 X10(3)/MCL
BASOPHILS NFR BLD AUTO: 0.6 %
BILIRUB SERPL-MCNC: 0.4 MG/DL
BUN SERPL-MCNC: 23.1 MG/DL (ref 9.8–20.1)
CALCIUM SERPL-MCNC: 9 MG/DL (ref 8.4–10.2)
CHLORIDE SERPL-SCNC: 110 MMOL/L (ref 98–107)
CO2 SERPL-SCNC: 20 MMOL/L (ref 22–29)
CREAT SERPL-MCNC: 1.62 MG/DL (ref 0.55–1.02)
CREAT/UREA NIT SERPL: 14
EOSINOPHIL # BLD AUTO: 0.12 X10(3)/MCL (ref 0–0.9)
EOSINOPHIL NFR BLD AUTO: 1.9 %
ERYTHROCYTE [DISTWIDTH] IN BLOOD BY AUTOMATED COUNT: 14.1 % (ref 11.5–17)
GFR SERPLBLD CREATININE-BSD FMLA CKD-EPI: 38 ML/MIN/1.73/M2
GLOBULIN SER-MCNC: 2.7 GM/DL (ref 2.4–3.5)
GLUCOSE SERPL-MCNC: 101 MG/DL (ref 74–100)
HCT VFR BLD AUTO: 30.7 % (ref 37–47)
HGB BLD-MCNC: 9.8 G/DL (ref 12–16)
IMM GRANULOCYTES # BLD AUTO: 0.03 X10(3)/MCL (ref 0–0.04)
IMM GRANULOCYTES NFR BLD AUTO: 0.5 %
LYMPHOCYTES # BLD AUTO: 1.05 X10(3)/MCL (ref 0.6–4.6)
LYMPHOCYTES NFR BLD AUTO: 17 %
MAGNESIUM SERPL-MCNC: 2.2 MG/DL (ref 1.6–2.6)
MCH RBC QN AUTO: 27.5 PG (ref 27–31)
MCHC RBC AUTO-ENTMCNC: 31.9 G/DL (ref 33–36)
MCV RBC AUTO: 86.2 FL (ref 80–94)
MONOCYTES # BLD AUTO: 0.7 X10(3)/MCL (ref 0.1–1.3)
MONOCYTES NFR BLD AUTO: 11.3 %
NEUTROPHILS # BLD AUTO: 4.24 X10(3)/MCL (ref 2.1–9.2)
NEUTROPHILS NFR BLD AUTO: 68.7 %
NRBC BLD AUTO-RTO: 0 %
PHOSPHATE SERPL-MCNC: 5 MG/DL (ref 2.3–4.7)
PLATELET # BLD AUTO: 157 X10(3)/MCL (ref 130–400)
PLATELETS.RETICULATED NFR BLD AUTO: 1.9 % (ref 0.9–11.2)
PMV BLD AUTO: 9.5 FL (ref 7.4–10.4)
POTASSIUM SERPL-SCNC: 3.5 MMOL/L (ref 3.5–5.1)
PROT SERPL-MCNC: 6.3 GM/DL (ref 6.4–8.3)
RBC # BLD AUTO: 3.56 X10(6)/MCL (ref 4.2–5.4)
SODIUM SERPL-SCNC: 143 MMOL/L (ref 136–145)
WBC # BLD AUTO: 6.18 X10(3)/MCL (ref 4.5–11.5)

## 2024-08-15 PROCEDURE — 25000003 PHARM REV CODE 250: Performed by: INTERNAL MEDICINE

## 2024-08-15 PROCEDURE — 84100 ASSAY OF PHOSPHORUS: CPT | Performed by: NURSE PRACTITIONER

## 2024-08-15 PROCEDURE — 63600175 PHARM REV CODE 636 W HCPCS: Performed by: NURSE PRACTITIONER

## 2024-08-15 PROCEDURE — 83735 ASSAY OF MAGNESIUM: CPT | Performed by: NURSE PRACTITIONER

## 2024-08-15 PROCEDURE — 85025 COMPLETE CBC W/AUTO DIFF WBC: CPT | Performed by: NURSE PRACTITIONER

## 2024-08-15 PROCEDURE — 95819 EEG AWAKE AND ASLEEP: CPT

## 2024-08-15 PROCEDURE — 63600175 PHARM REV CODE 636 W HCPCS

## 2024-08-15 PROCEDURE — 25000003 PHARM REV CODE 250

## 2024-08-15 PROCEDURE — 80053 COMPREHEN METABOLIC PANEL: CPT | Performed by: NURSE PRACTITIONER

## 2024-08-15 PROCEDURE — 11000001 HC ACUTE MED/SURG PRIVATE ROOM

## 2024-08-15 PROCEDURE — 36415 COLL VENOUS BLD VENIPUNCTURE: CPT | Performed by: NURSE PRACTITIONER

## 2024-08-15 PROCEDURE — 25000003 PHARM REV CODE 250: Performed by: NURSE PRACTITIONER

## 2024-08-15 RX ORDER — ENOXAPARIN SODIUM 100 MG/ML
30 INJECTION SUBCUTANEOUS EVERY 24 HOURS
Status: DISCONTINUED | OUTPATIENT
Start: 2024-08-15 | End: 2024-08-15

## 2024-08-15 RX ORDER — TALC
6 POWDER (GRAM) TOPICAL NIGHTLY PRN
Status: DISCONTINUED | OUTPATIENT
Start: 2024-08-15 | End: 2024-08-17 | Stop reason: HOSPADM

## 2024-08-15 RX ORDER — PNV NO.95/FERROUS FUM/FOLIC AC 28MG-0.8MG
100 TABLET ORAL DAILY
Status: DISCONTINUED | OUTPATIENT
Start: 2024-08-15 | End: 2024-08-17 | Stop reason: HOSPADM

## 2024-08-15 RX ORDER — TOPIRAMATE 25 MG/1
50 TABLET ORAL NIGHTLY
Status: DISCONTINUED | OUTPATIENT
Start: 2024-08-15 | End: 2024-08-17 | Stop reason: HOSPADM

## 2024-08-15 RX ORDER — BUSPIRONE HYDROCHLORIDE 15 MG/1
30 TABLET ORAL 2 TIMES DAILY
Status: DISCONTINUED | OUTPATIENT
Start: 2024-08-15 | End: 2024-08-17 | Stop reason: HOSPADM

## 2024-08-15 RX ORDER — LEVOTHYROXINE SODIUM 25 UG/1
25 TABLET ORAL
Status: DISCONTINUED | OUTPATIENT
Start: 2024-08-15 | End: 2024-08-17 | Stop reason: HOSPADM

## 2024-08-15 RX ORDER — PROPRANOLOL HYDROCHLORIDE 10 MG/1
10 TABLET ORAL 2 TIMES DAILY
Status: DISCONTINUED | OUTPATIENT
Start: 2024-08-15 | End: 2024-08-17 | Stop reason: HOSPADM

## 2024-08-15 RX ORDER — SODIUM CHLORIDE, SODIUM LACTATE, POTASSIUM CHLORIDE, CALCIUM CHLORIDE 600; 310; 30; 20 MG/100ML; MG/100ML; MG/100ML; MG/100ML
INJECTION, SOLUTION INTRAVENOUS CONTINUOUS
Status: DISCONTINUED | OUTPATIENT
Start: 2024-08-15 | End: 2024-08-16

## 2024-08-15 RX ORDER — METHADONE HYDROCHLORIDE 10 MG/1
10 TABLET ORAL 4 TIMES DAILY
Status: DISCONTINUED | OUTPATIENT
Start: 2024-08-15 | End: 2024-08-15

## 2024-08-15 RX ORDER — HYDROCODONE BITARTRATE AND ACETAMINOPHEN 10; 325 MG/1; MG/1
1 TABLET ORAL EVERY 4 HOURS PRN
Status: DISCONTINUED | OUTPATIENT
Start: 2024-08-15 | End: 2024-08-15

## 2024-08-15 RX ORDER — DOCUSATE SODIUM 100 MG/1
100 CAPSULE, LIQUID FILLED ORAL 2 TIMES DAILY
Status: DISCONTINUED | OUTPATIENT
Start: 2024-08-15 | End: 2024-08-17 | Stop reason: HOSPADM

## 2024-08-15 RX ORDER — NALOXONE HCL 0.4 MG/ML
0.02 VIAL (ML) INJECTION
Status: DISCONTINUED | OUTPATIENT
Start: 2024-08-15 | End: 2024-08-17 | Stop reason: HOSPADM

## 2024-08-15 RX ORDER — ALUMINUM HYDROXIDE, MAGNESIUM HYDROXIDE, AND SIMETHICONE 1200; 120; 1200 MG/30ML; MG/30ML; MG/30ML
30 SUSPENSION ORAL 4 TIMES DAILY PRN
Status: DISCONTINUED | OUTPATIENT
Start: 2024-08-15 | End: 2024-08-17 | Stop reason: HOSPADM

## 2024-08-15 RX ORDER — SIMETHICONE 80 MG
1 TABLET,CHEWABLE ORAL 4 TIMES DAILY PRN
Status: DISCONTINUED | OUTPATIENT
Start: 2024-08-15 | End: 2024-08-17 | Stop reason: HOSPADM

## 2024-08-15 RX ORDER — LORAZEPAM 1 MG/1
1 TABLET ORAL EVERY 6 HOURS PRN
Status: DISCONTINUED | OUTPATIENT
Start: 2024-08-15 | End: 2024-08-17 | Stop reason: HOSPADM

## 2024-08-15 RX ORDER — ENOXAPARIN SODIUM 100 MG/ML
40 INJECTION SUBCUTANEOUS EVERY 24 HOURS
Status: DISCONTINUED | OUTPATIENT
Start: 2024-08-15 | End: 2024-08-17 | Stop reason: HOSPADM

## 2024-08-15 RX ORDER — PANTOPRAZOLE SODIUM 20 MG/1
20 TABLET, DELAYED RELEASE ORAL 2 TIMES DAILY
Status: DISCONTINUED | OUTPATIENT
Start: 2024-08-15 | End: 2024-08-17 | Stop reason: HOSPADM

## 2024-08-15 RX ORDER — CYCLOBENZAPRINE HCL 10 MG
10 TABLET ORAL 3 TIMES DAILY
Status: DISCONTINUED | OUTPATIENT
Start: 2024-08-15 | End: 2024-08-17 | Stop reason: HOSPADM

## 2024-08-15 RX ORDER — METHADONE HYDROCHLORIDE 10 MG/1
10 TABLET ORAL 4 TIMES DAILY PRN
Status: DISCONTINUED | OUTPATIENT
Start: 2024-08-15 | End: 2024-08-15

## 2024-08-15 RX ORDER — LEVETIRACETAM 500 MG/5ML
500 INJECTION, SOLUTION, CONCENTRATE INTRAVENOUS EVERY 12 HOURS
Status: DISCONTINUED | OUTPATIENT
Start: 2024-08-15 | End: 2024-08-15

## 2024-08-15 RX ORDER — PROCHLORPERAZINE EDISYLATE 5 MG/ML
5 INJECTION INTRAMUSCULAR; INTRAVENOUS EVERY 6 HOURS PRN
Status: DISCONTINUED | OUTPATIENT
Start: 2024-08-15 | End: 2024-08-17 | Stop reason: HOSPADM

## 2024-08-15 RX ORDER — HYDROXYZINE HYDROCHLORIDE 25 MG/1
25 TABLET, FILM COATED ORAL 3 TIMES DAILY PRN
Status: DISCONTINUED | OUTPATIENT
Start: 2024-08-15 | End: 2024-08-17 | Stop reason: HOSPADM

## 2024-08-15 RX ORDER — SERTRALINE HYDROCHLORIDE 50 MG/1
100 TABLET, FILM COATED ORAL 2 TIMES DAILY
Status: DISCONTINUED | OUTPATIENT
Start: 2024-08-15 | End: 2024-08-17 | Stop reason: HOSPADM

## 2024-08-15 RX ORDER — METHADONE HYDROCHLORIDE 10 MG/1
10 TABLET ORAL 4 TIMES DAILY
Status: DISCONTINUED | OUTPATIENT
Start: 2024-08-15 | End: 2024-08-17 | Stop reason: HOSPADM

## 2024-08-15 RX ORDER — MIRTAZAPINE 15 MG/1
15 TABLET, FILM COATED ORAL NIGHTLY
Status: DISCONTINUED | OUTPATIENT
Start: 2024-08-15 | End: 2024-08-17 | Stop reason: HOSPADM

## 2024-08-15 RX ORDER — ONDANSETRON HYDROCHLORIDE 2 MG/ML
4 INJECTION, SOLUTION INTRAVENOUS EVERY 4 HOURS PRN
Status: DISCONTINUED | OUTPATIENT
Start: 2024-08-15 | End: 2024-08-17 | Stop reason: HOSPADM

## 2024-08-15 RX ORDER — GABAPENTIN 400 MG/1
800 CAPSULE ORAL 3 TIMES DAILY
Status: DISCONTINUED | OUTPATIENT
Start: 2024-08-15 | End: 2024-08-17 | Stop reason: HOSPADM

## 2024-08-15 RX ORDER — PRAVASTATIN SODIUM 10 MG/1
10 TABLET ORAL NIGHTLY
Status: DISCONTINUED | OUTPATIENT
Start: 2024-08-15 | End: 2024-08-17 | Stop reason: HOSPADM

## 2024-08-15 RX ORDER — POLYETHYLENE GLYCOL 3350 17 G/17G
17 POWDER, FOR SOLUTION ORAL 2 TIMES DAILY PRN
Status: DISCONTINUED | OUTPATIENT
Start: 2024-08-15 | End: 2024-08-17 | Stop reason: HOSPADM

## 2024-08-15 RX ORDER — ACETAMINOPHEN 325 MG/1
650 TABLET ORAL EVERY 6 HOURS PRN
Status: DISCONTINUED | OUTPATIENT
Start: 2024-08-15 | End: 2024-08-17 | Stop reason: HOSPADM

## 2024-08-15 RX ADMIN — ENOXAPARIN SODIUM 40 MG: 40 INJECTION SUBCUTANEOUS at 05:08

## 2024-08-15 RX ADMIN — CYCLOBENZAPRINE 10 MG: 10 TABLET, FILM COATED ORAL at 01:08

## 2024-08-15 RX ADMIN — BUSPIRONE HYDROCHLORIDE 30 MG: 15 TABLET ORAL at 01:08

## 2024-08-15 RX ADMIN — GABAPENTIN 800 MG: 400 CAPSULE ORAL at 09:08

## 2024-08-15 RX ADMIN — TOPIRAMATE 50 MG: 25 TABLET, FILM COATED ORAL at 09:08

## 2024-08-15 RX ADMIN — METHADONE HYDROCHLORIDE 10 MG: 10 TABLET ORAL at 01:08

## 2024-08-15 RX ADMIN — PRAVASTATIN SODIUM 10 MG: 10 TABLET ORAL at 02:08

## 2024-08-15 RX ADMIN — GABAPENTIN 800 MG: 400 CAPSULE ORAL at 03:08

## 2024-08-15 RX ADMIN — MIRTAZAPINE 15 MG: 15 TABLET, FILM COATED ORAL at 09:08

## 2024-08-15 RX ADMIN — PRAVASTATIN SODIUM 10 MG: 10 TABLET ORAL at 09:08

## 2024-08-15 RX ADMIN — PROPRANOLOL HYDROCHLORIDE 10 MG: 10 TABLET ORAL at 01:08

## 2024-08-15 RX ADMIN — CYCLOBENZAPRINE 10 MG: 10 TABLET, FILM COATED ORAL at 09:08

## 2024-08-15 RX ADMIN — DOCUSATE SODIUM 100 MG: 100 CAPSULE, LIQUID FILLED ORAL at 09:08

## 2024-08-15 RX ADMIN — LEVETIRACETAM 500 MG: 100 INJECTION, SOLUTION INTRAVENOUS at 01:08

## 2024-08-15 RX ADMIN — PROPRANOLOL HYDROCHLORIDE 10 MG: 10 TABLET ORAL at 09:08

## 2024-08-15 RX ADMIN — METHADONE HYDROCHLORIDE 10 MG: 10 TABLET ORAL at 05:08

## 2024-08-15 RX ADMIN — METHADONE HYDROCHLORIDE 10 MG: 10 TABLET ORAL at 02:08

## 2024-08-15 RX ADMIN — SODIUM CHLORIDE, POTASSIUM CHLORIDE, SODIUM LACTATE AND CALCIUM CHLORIDE: 600; 310; 30; 20 INJECTION, SOLUTION INTRAVENOUS at 01:08

## 2024-08-15 RX ADMIN — VITAM B12 100 MCG: 100 TAB at 09:08

## 2024-08-15 RX ADMIN — CYCLOBENZAPRINE 10 MG: 10 TABLET, FILM COATED ORAL at 03:08

## 2024-08-15 RX ADMIN — PANTOPRAZOLE SODIUM 20 MG: 20 TABLET, DELAYED RELEASE ORAL at 09:08

## 2024-08-15 RX ADMIN — SERTRALINE HYDROCHLORIDE 100 MG: 50 TABLET ORAL at 09:08

## 2024-08-15 RX ADMIN — METHADONE HYDROCHLORIDE 10 MG: 10 TABLET ORAL at 09:08

## 2024-08-15 RX ADMIN — PANTOPRAZOLE SODIUM 20 MG: 20 TABLET, DELAYED RELEASE ORAL at 01:08

## 2024-08-15 RX ADMIN — HYDROCODONE BITARTRATE AND ACETAMINOPHEN 1 TABLET: 10; 325 TABLET ORAL at 01:08

## 2024-08-15 RX ADMIN — LEVETIRACETAM 500 MG: 100 INJECTION, SOLUTION INTRAVENOUS at 09:08

## 2024-08-15 RX ADMIN — SERTRALINE HYDROCHLORIDE 100 MG: 50 TABLET ORAL at 01:08

## 2024-08-15 RX ADMIN — BUSPIRONE HYDROCHLORIDE 30 MG: 15 TABLET ORAL at 09:08

## 2024-08-15 RX ADMIN — TOPIRAMATE 50 MG: 25 TABLET, FILM COATED ORAL at 02:08

## 2024-08-15 RX ADMIN — LEVOTHYROXINE SODIUM 25 MCG: 25 TABLET ORAL at 06:08

## 2024-08-15 RX ADMIN — SODIUM CHLORIDE, POTASSIUM CHLORIDE, SODIUM LACTATE AND CALCIUM CHLORIDE: 600; 310; 30; 20 INJECTION, SOLUTION INTRAVENOUS at 09:08

## 2024-08-15 RX ADMIN — DOCUSATE SODIUM 100 MG: 100 CAPSULE, LIQUID FILLED ORAL at 01:08

## 2024-08-15 RX ADMIN — THERA TABS 1 TABLET: TAB at 09:08

## 2024-08-15 NOTE — CONSULTS
"8/15/2024  Jordan Gandara   1970   [id number]            Psychiatry Initial Consult Note     Date of Admission: 8/14/2024  5:30 PM    Chief Complaint: Medication management, possible panic attacks    SUBJECTIVE:   History of Present Illness:   Jordan Gandara is a 53 y.o. female with a past medical history that includes GERD, IBS, hypothyroidism, BRAULIO, anxiety/depression, and chronic low back pain who presented to ED with seizure like activity that started occurring the day prior to arrival. Stated it involved all four extremities, that she was awake and alert during these. Reported they last a minute or two and that she has urinated on her self and bitten her tongue when it happened. Denies a history of seizures. CT head without revealed loss of gray-white matter differentiation to bilateral frontoparietal regions. MRI and EEG pending. Psychiatry consulted for possible panic attacks.    Seen at the bedside with her  present. She is witnessed having an episode at beginning of interview. Displays tremor like movement in all four extremities. Eyes open and voices "please make it stop". No post-ictal symptoms following episode. She reports a history of bipolar disorder, anxiety, and panic attacks. Reports that these are not like the panic attacks she has experience before. Reports last panic attack as several weeks ago and denies increases in anxiety in past several weeks or new stressors. Does report multiple ongoing stressors however such as her chronic medical conditions and taking care of her son who is special needs and has a history of autism spectrum disorder and spina bifida. While reporting a previous diagnosis of bipolar disorder she is unable to recall an episode consistent with theo. Reports typical abnormal mood episodes are depressed. Reports depressed mood has been well-controlled on current outpatient medications, but does endorse poor sleep. Denies depressed mood, changes in appetite, loss " "of interest in normal activities, or suicidal ideations. No evidence of psychosis at this time. Goal-directed thought process without delusional ideations. Denies homicidal ideations, hallucinations, or paranoia.           Past Psychiatric History:   Previous Psychiatric Hospitalizations: Denies  Previous Medication Trials: Buspirone, diazepam, sertraline,   Previous Suicide Attempts: Denies   Outpatient psychiatrist: Patricia MERRITT    Current Medications:   Home Psychiatric Meds: Buspirone 30mg PO BID; Sertraline 100mg PO BID    Past Medical/Surgical History:   Past Medical History:   Diagnosis Date    Anxiety     Chronic lower back pain     Depression     GERD (gastroesophageal reflux disease)     IBS (irritable bowel syndrome)     Left knee pain, unspecified chronicity     Neuropathy     Obesity     Personal history of fall     Sleep apnea     Will need Bi Pap does not have yet    Swelling of left knee joint     Thyroid disease     Tremors of nervous system      Past Surgical History:   Procedure Laterality Date    ADENOIDECTOMY      CERVICAL SPINE SURGERY      C5, C6 with plates and screws     SECTION      X1    CHOLECYSTECTOMY      DISC REMOVAL      FRACTURE SURGERY Right     wrist    KNEE ARTHROSCOPY W/ MENISCECTOMY Left 2023    Procedure: ARTHROSCOPY, KNEE, WITH MENISCECTOMY (LMT);  Surgeon: Britton Maya MD;  Location: [facility] OR;  Service: Orthopedics;  Laterality: Left;  partial lateral meniscectomy    LAPAROSCOPIC SLEEVE GASTRECTOMY      MAGNETIC RESONANCE IMAGING N/A 5/15/2023    Procedure: MRI (Magnetic Resonance Imagine);  Surgeon: Judy Garvin MD;  Location: [facility] OR;  Service: Anesthesiology;  Laterality: N/A;  MRI KNEE W/O CONTRAST WITH ANESTHESIA    OOPHORECTOMY      ONE OVARY    ROBOTIC ABLATION OR EXCISION, ENDOMETRIOSIS      SPINAL FUSION      TONSILLECTOMY           Family Psychiatric History:   Mother- "Nervous breakdown"     Allergies:   Review of patient's allergies " indicates:   Allergen Reactions    Diclofenac      Other reaction(s): nausea, vomiting    Nsaids (non-steroidal anti-inflammatory drug)     Toradol [ketorolac] Nausea Only     severe       Substance Abuse History:   Tobacco: Denies  Alcohol: Denies regular use  Illicit Substances: Denies  Treatment: Denies        Scheduled Meds:    busPIRone  30 mg Oral BID    cyanocobalamin  100 mcg Oral Daily    cyclobenzaprine  10 mg Oral TID    docusate sodium  100 mg Oral BID    enoxparin  40 mg Subcutaneous Daily    gabapentin  800 mg Oral TID    levETIRAcetam (Keppra) IV (PEDS and ADULTS)  500 mg Intravenous Q12H    levothyroxine  25 mcg Oral Before breakfast    methadone  10 mg Oral QID    multivitamin  1 tablet Oral Daily    pantoprazole  20 mg Oral BID    pravastatin  10 mg Oral QHS    propranoloL  10 mg Oral BID    sertraline  100 mg Oral BID    topiramate  50 mg Oral QHS      PRN Meds:   Current Facility-Administered Medications:     acetaminophen, 650 mg, Oral, Q6H PRN    aluminum-magnesium hydroxide-simethicone, 30 mL, Oral, QID PRN    LORazepam, 1 mg, Oral, Q6H PRN    melatonin, 6 mg, Oral, Nightly PRN    naloxone, 0.02 mg, Intravenous, PRN    ondansetron, 4 mg, Intravenous, Q4H PRN    polyethylene glycol, 17 g, Oral, BID PRN    prochlorperazine, 5 mg, Intravenous, Q6H PRN    simethicone, 1 tablet, Oral, QID PRN   Psychotherapeutics (From admission, onward)      Start     Stop Route Frequency Ordered    08/15/24 0950  LORazepam tablet 1 mg         -- Oral Every 6 hours PRN 08/15/24 0850    08/15/24 0115  busPIRone tablet 30 mg         -- Oral 2 times daily 08/15/24 0106    08/15/24 0115  sertraline tablet 100 mg         -- Oral 2 times daily 08/15/24 0106              Social History:  Housing Status: Lives with  and son  Relationship Status/Sexual Orientation:    Children: 1  Education: Completed high school   Employment Status/Info: Nurse    history: Denies  History of physical/sexual abuse:  Denies   Access to gun: Yes       Legal History:   Past Charges/Incarcerations: Denies   Pending charges: Denies      OBJECTIVE:       Vitals   Vitals:    08/15/24 1324   BP:    Pulse:    Resp: 18   Temp:         Labs/Imaging/Studies:   Recent Results (from the past 48 hour(s))   CK    Collection Time: 08/14/24  3:27 PM   Result Value Ref Range     (H) 29 - 168 U/L   PHOSPHORUS    Collection Time: 08/14/24  3:27 PM   Result Value Ref Range    Phosphorus Level 5.8 (H) 2.3 - 4.7 MG/DL   MAGNESIUM    Collection Time: 08/14/24  3:27 PM   Result Value Ref Range    Magnesium Level 2.1 1.6 - 2.6 MG/DL   Urinalysis, Reflex to Urine Culture    Collection Time: 08/14/24  5:57 PM    Specimen: Urine   Result Value Ref Range    Color, UA Light-Yellow Yellow, Light-Yellow, Colorless, Straw, Dark-Yellow    Appearance, UA Turbid (A) Clear    Specific Gravity, UA 1.011 1.005 - 1.030    pH, UA 5.5 5.0 - 8.5    Protein, UA 1+ (A) Negative    Glucose, UA Trace (A) Negative, Normal    Ketones, UA Negative Negative    Blood, UA 1+ (A) Negative    Bilirubin, UA Negative Negative    Urobilinogen, UA Normal 0.2, 1.0, Normal    Nitrites, UA Negative Negative    Leukocyte Esterase, UA Negative Negative    RBC, UA 6-10 (A) None Seen, 0-2, 3-5, 0-5 /HPF    WBC, UA 11-20 (A) None Seen, 0-2, 3-5, 0-5 /HPF    Bacteria, UA Trace None Seen, Trace /HPF    Squamous Epithelial Cells, UA Trace None Seen /HPF    Mucous, UA Trace (A) None Seen /LPF   Pregnancy, urine rapid    Collection Time: 08/14/24  5:57 PM   Result Value Ref Range    hCG Qualitative, Urine Negative Negative   Urine culture    Collection Time: 08/14/24  5:57 PM    Specimen: Urine   Result Value Ref Range    Urine Culture No Growth At 24 Hours    Comprehensive metabolic panel    Collection Time: 08/14/24  6:28 PM   Result Value Ref Range    Sodium 139 136 - 145 mmol/L    Potassium 3.8 3.5 - 5.1 mmol/L    Chloride 103 98 - 107 mmol/L    CO2 21 (L) 22 - 29 mmol/L    Glucose 99 74 -  100 mg/dL    Blood Urea Nitrogen 24.1 (H) 9.8 - 20.1 mg/dL    Creatinine 2.37 (H) 0.55 - 1.02 mg/dL    Calcium 9.2 8.4 - 10.2 mg/dL    Protein Total 7.7 6.4 - 8.3 gm/dL    Albumin 4.0 3.5 - 5.0 g/dL    Globulin 3.7 (H) 2.4 - 3.5 gm/dL    Albumin/Globulin Ratio 1.1 1.1 - 2.0 ratio    Bilirubin Total 0.4 <=1.5 mg/dL     (H) 40 - 150 unit/L    ALT 21 0 - 55 unit/L    AST 28 5 - 34 unit/L    eGFR 24 mL/min/1.73/m2    Anion Gap 15.0 mEq/L    BUN/Creatinine Ratio 10    CBC with Differential    Collection Time: 08/14/24  6:28 PM   Result Value Ref Range    WBC 10.92 4.50 - 11.50 x10(3)/mcL    RBC 4.05 (L) 4.20 - 5.40 x10(6)/mcL    Hgb 11.0 (L) 12.0 - 16.0 g/dL    Hct 33.9 (L) 37.0 - 47.0 %    MCV 83.7 80.0 - 94.0 fL    MCH 27.2 27.0 - 31.0 pg    MCHC 32.4 (L) 33.0 - 36.0 g/dL    RDW 13.9 11.5 - 17.0 %    Platelet 175 130 - 400 x10(3)/mcL    MPV 9.5 7.4 - 10.4 fL    Neut % 73.0 %    Lymph % 15.1 %    Mono % 9.7 %    Eos % 1.4 %    Basophil % 0.5 %    Lymph # 1.65 0.6 - 4.6 x10(3)/mcL    Neut # 7.97 2.1 - 9.2 x10(3)/mcL    Mono # 1.06 0.1 - 1.3 x10(3)/mcL    Eos # 0.15 0 - 0.9 x10(3)/mcL    Baso # 0.06 <=0.2 x10(3)/mcL    IG# 0.03 0 - 0.04 x10(3)/mcL    IG% 0.3 %    NRBC% 0.0 %    IPF 1.8 0.9 - 11.2 %   Drug Screen, Urine    Collection Time: 08/14/24  7:53 PM   Result Value Ref Range    Amphetamines, Urine Negative Negative    Barbiturates, Urine Negative Negative    Benzodiazepine, Urine Negative Negative    Cannabinoids, Urine Negative Negative    Cocaine, Urine Negative Negative    Fentanyl, Urine Negative Negative    MDMA, Urine Negative Negative    Opiates, Urine Positive (A) Negative    Phencyclidine, Urine Negative Negative    pH, Urine 5.5 3.0 - 11.0    Specific Gravity, Urine Auto 1.011 1.001 - 1.035   Comprehensive Metabolic Panel (CMP)    Collection Time: 08/15/24  6:07 AM   Result Value Ref Range    Sodium 143 136 - 145 mmol/L    Potassium 3.5 3.5 - 5.1 mmol/L    Chloride 110 (H) 98 - 107 mmol/L    CO2 20  "(L) 22 - 29 mmol/L    Glucose 101 (H) 74 - 100 mg/dL    Blood Urea Nitrogen 23.1 (H) 9.8 - 20.1 mg/dL    Creatinine 1.62 (H) 0.55 - 1.02 mg/dL    Calcium 9.0 8.4 - 10.2 mg/dL    Protein Total 6.3 (L) 6.4 - 8.3 gm/dL    Albumin 3.6 3.5 - 5.0 g/dL    Globulin 2.7 2.4 - 3.5 gm/dL    Albumin/Globulin Ratio 1.3 1.1 - 2.0 ratio    Bilirubin Total 0.4 <=1.5 mg/dL     40 - 150 unit/L    ALT 17 0 - 55 unit/L    AST 23 5 - 34 unit/L    eGFR 38 mL/min/1.73/m2    Anion Gap 13.0 mEq/L    BUN/Creatinine Ratio 14    Magnesium    Collection Time: 08/15/24  6:07 AM   Result Value Ref Range    Magnesium Level 2.20 1.60 - 2.60 mg/dL   Phosphorus    Collection Time: 08/15/24  6:07 AM   Result Value Ref Range    Phosphorus Level 5.0 (H) 2.3 - 4.7 mg/dL   CBC with Differential    Collection Time: 08/15/24  6:07 AM   Result Value Ref Range    WBC 6.18 4.50 - 11.50 x10(3)/mcL    RBC 3.56 (L) 4.20 - 5.40 x10(6)/mcL    Hgb 9.8 (L) 12.0 - 16.0 g/dL    Hct 30.7 (L) 37.0 - 47.0 %    MCV 86.2 80.0 - 94.0 fL    MCH 27.5 27.0 - 31.0 pg    MCHC 31.9 (L) 33.0 - 36.0 g/dL    RDW 14.1 11.5 - 17.0 %    Platelet 157 130 - 400 x10(3)/mcL    MPV 9.5 7.4 - 10.4 fL    Neut % 68.7 %    Lymph % 17.0 %    Mono % 11.3 %    Eos % 1.9 %    Basophil % 0.6 %    Lymph # 1.05 0.6 - 4.6 x10(3)/mcL    Neut # 4.24 2.1 - 9.2 x10(3)/mcL    Mono # 0.70 0.1 - 1.3 x10(3)/mcL    Eos # 0.12 0 - 0.9 x10(3)/mcL    Baso # 0.04 <=0.2 x10(3)/mcL    IG# 0.03 0 - 0.04 x10(3)/mcL    IG% 0.5 %    NRBC% 0.0 %    IPF 1.9 0.9 - 11.2 %      No results found for: "PHENYTOIN", "PHENOBARB", "VALPROATE", "CBMZ"        Psychiatric Mental Status Exam:  General Appearance: appears stated age, dressed in hospital garb, lying in bed  Arousal: alert  Behavior: cooperative, polite, appropriate eye-contact  Movements and Motor Activity:  seizure like activity in all four extremities   Orientation: oriented to person, place, time, and situation  Speech: normal rate, rhythm, volume, tone and " pitch  Mood: Anxious  Affect: mood-congruent, anxious  Thought Process: intact, linear, goal-directed, organized, logical  Associations: intact, no loosening of associations  Thought Content and Perceptions: no suicidal or homicidal ideation, no auditory or visual hallucinations, no paranoid ideation, no ideas of reference, no evidence of delusions or psychosis  Recent and Remote Memory: grossly intact, able to recall relevant and salient information from the recent and remote past; per interview/observation with patient  Attention and Concentration: grossly intact; per interview/observation with patient  Fund of Knowledge: grossly intact; based on history, vocabulary, fund of knowledge, syntax, grammar, and content  Insight: adequate; based on understanding of severity of illness and HPI  Judgment: adequate; based on patient's behavior and HPI        ASSESSMENT/PLAN:   Diagnoses:  Anxiety disorder NOS rule-out panic attacks vs functional neurological disorder  History of major depressive disorder  History of panic attacks    Displays seizure like activity that does not appear to be epileptic in nature. Is alert during these episodes and does have a history of panic attacks with other symptoms. Possible functional neurologic (conversion) disorder and neurology currently working up for possible neurological etiology.      Past Medical History:   Diagnosis Date    Anxiety     Chronic lower back pain     Depression     GERD (gastroesophageal reflux disease)     IBS (irritable bowel syndrome)     Left knee pain, unspecified chronicity     Neuropathy     Obesity     Personal history of fall     Sleep apnea     Will need Bi Pap does not have yet    Swelling of left knee joint     Thyroid disease     Tremors of nervous system           Problem lists and Management Plans:  Medication management  Buspirone 30mg PO BID  Sertraline 100mg PO BID  Remeron 15mg PO QHS  Hydroxyzine 25mg PO TID PRN anxiety  PEC not recommended at  this time. Not currently an imminent risk of harm to self or others.  Would benefit from outpatient psychotherapy.  Will continue to follow        Deangelo Romo

## 2024-08-15 NOTE — H&P
[facility] Hospital Medicine - H&P Note    Patient Name: Jordan Gandara  : 1970  MRN: [id number]  PCP: Hanna Benitez MD  Admitting Physician: Wesly Cisse MD  Admission Class: IP- Inpatient   Length of Stay: 0  Face-to-Face encounter date: 2024  Code status: --full    Chief Complaint   Seizure-like activity.    History of Present Illness   Ms. Gandara is a 53 year old female with a pmh of GERD, IBS, hypothyroidism, BRAULIO, anxiety/depression, chronic low back pain who presented to the ED with seizure like activity.  She states that it started occurring yesterday. She states that it involves all 4 extremities, that she is awake and alert during the process. She states that these seizures only last a minute or two and has urinated on herself and bitten her tongue when it happens. Denies any post-ictal symptoms.    ED vitals on arrival:  Temp 98.3° F, pulse 98, resp 22, /65, SpO2 94% on RA.  Today's ED lab work revealed H&H 11/33.9, CO2 21, BUN/CR 24.1/2.37, , UA positive for opiates.  CT head without contrast showed some loss of gray-white differentiation in the frontal and parietal regions.  MRI correlation is recommended.  She was given Keppra, droperidol, Valium, and fluids in the ED.  She was admitted to Hospital Medicine for management.      ROS   Except as documented, all other systems reviewed and negative     Past Medical History     Past Medical History:   Diagnosis Date    Anxiety     Chronic lower back pain     Depression     GERD (gastroesophageal reflux disease)     IBS (irritable bowel syndrome)     Left knee pain, unspecified chronicity     Neuropathy     Obesity     Personal history of fall     Sleep apnea     Will need Bi Pap does not have yet    Swelling of left knee joint     Thyroid disease     Tremors of nervous system        Past Surgical History     Past Surgical History:   Procedure Laterality Date    ADENOIDECTOMY      CERVICAL SPINE  SURGERY      C5, C6 with plates and screws     SECTION      X1    CHOLECYSTECTOMY      DISC REMOVAL      FRACTURE SURGERY Right     wrist    KNEE ARTHROSCOPY W/ MENISCECTOMY Left 2023    Procedure: ARTHROSCOPY, KNEE, WITH MENISCECTOMY (LMT);  Surgeon: Britton Maya MD;  Location: [facility] OR;  Service: Orthopedics;  Laterality: Left;  partial lateral meniscectomy    LAPAROSCOPIC SLEEVE GASTRECTOMY      MAGNETIC RESONANCE IMAGING N/A 5/15/2023    Procedure: MRI (Magnetic Resonance Imagine);  Surgeon: Judy Garvin MD;  Location: [facility] OR;  Service: Anesthesiology;  Laterality: N/A;  MRI KNEE W/O CONTRAST WITH ANESTHESIA    OOPHORECTOMY      ONE OVARY    ROBOTIC ABLATION OR EXCISION, ENDOMETRIOSIS      SPINAL FUSION      TONSILLECTOMY         Social History     Screening for Social Drivers for health: Patient screened for food insecurity, housing instability, transportation needs, utility   difficulties, and interpersonal safety (select all that apply as identified as concern)  []Housing or Food  []Transportation Needs  []Utility Difficulties  []Interpersonal safety  [x]None    Social History     Tobacco Use    Smoking status: Never    Smokeless tobacco: Never   Substance Use Topics    Alcohol use: Not Currently        Family History   Reviewed and negative    Allergies   Diclofenac, Nsaids (non-steroidal anti-inflammatory drug), and Toradol [ketorolac]    Home Medications     Prior to Admission medications    Medication Sig Start Date End Date Taking? Authorizing Provider   biotin-keratin 10,000-100 mcg-mg Tab Take 1 capsule by mouth every morning.   Yes Provider, Historical   busPIRone (BUSPAR) 30 MG Tab Take 30 mg by mouth 2 (two) times daily. 3/23/23  Yes Provider, Historical   cevimeline (EVOXAC) 30 mg capsule Take 30 mg by mouth 3 (three) times daily. 23  Yes Provider, Historical   cyanocobalamin (VITAMIN B-12) 100 MCG tablet Take 1 capsule by mouth every morning.   Yes Provider,  Historical   cyclobenzaprine (FLEXERIL) 10 MG tablet Take 10 mg by mouth 3 (three) times daily. 3/23/23  Yes Provider, Historical   docusate sodium (COLACE) 100 MG capsule Take 100 mg by mouth 2 (two) times daily.   Yes Provider, Historical   furosemide (LASIX) 20 MG tablet Take 20 mg by mouth every morning. 4/17/23  Yes Provider, Historical   gabapentin (NEURONTIN) 800 MG tablet Take 800 mg by mouth 3 (three) times daily. 3/23/23  Yes Provider, Historical   HYDROcodone-acetaminophen (NORCO)  mg per tablet 1 tablet every 4 (four) hours as needed. 3/23/23  Yes Provider, Historical   levothyroxine (SYNTHROID) 25 MCG tablet Take 25 mcg by mouth before breakfast. 2/13/23  Yes Provider, Historical   methadone (DOLOPHINE) 10 MG tablet Take 10 mg by mouth 4 (four) times daily. 3/23/23  Yes Provider, Historical   multivitamin (THERAGRAN) per tablet Take 1 tablet by mouth 2 (two) times a day.   Yes Provider, Historical   ondansetron (ZOFRAN-ODT) 8 MG TbDL 8 mg as needed. 11/23/22  Yes Provider, Historical   pantoprazole (PROTONIX) 40 MG tablet Take 20 mg by mouth 2 (two) times a day. 2/26/23  Yes Provider, Historical   pravastatin (PRAVACHOL) 10 MG tablet Take 10 mg by mouth every evening. 3/10/23  Yes Provider, Historical   propranoloL (INDERAL) 20 MG tablet Take 10 mg by mouth 2 (two) times daily. 3/7/23  Yes Provider, Historical   senna (SENOKOT) 8.6 mg tablet Take 17.2 mg by mouth every evening.   Yes Provider, Historical   sertraline (ZOLOFT) 100 MG tablet Take 100 mg by mouth 2 (two) times a day. 3/23/23  Yes Provider, Historical   topiramate (TROKENDI XR) 50 mg Cp24 Take 1 capsule by mouth every evening. 3/27/23  Yes Provider, Historical   aspirin (ECOTRIN) 81 MG EC tablet Take 81 mg by mouth every evening. Stopped 6/7/23    Provider, Historical   calcium carbonate (OS-HANNAH) 600 mg calcium (1,500 mg) Tab Take 600 mg by mouth 2 (two) times a day.    Provider, Historical   dicyclomine (BENTYL) 10 MG capsule Take 10  "mg by mouth as needed. 3/27/23   Provider, Historical   HYDROcodone-acetaminophen (NORCO)  mg per tablet Take 1 tablet by mouth every 6 (six) hours as needed for Pain. 6/8/23   Britton Maya MD   trospium (SANCTURA) 20 mg Tab tablet Take 20 mg by mouth 2 (two) times daily. 2/28/23   Provider, Historical        Physical Exam   Vital Signs  Temp:  [98.3 °F (36.8 °C)]   Pulse:  [86-98]   Resp:  [17-26]   BP: (107-123)/(46-84)   SpO2:  [93 %-97 %]    General: Well developed, well nourished. In no acute distress, non-toxic appearing  HEENT: NC/AT  Neck:  Supple. No JVD  Chest: Clear bilaterally, no wheezing, no accessory muscle use.  CVS: Regular rhythm. Normal S1/S2.  Abdomen: nondistended, normoactive BS, soft and non-tender.  MSK: No obvious deformity or joint swelling  Skin: Warm and dry  Neuro: AAOx3, no focal neurological deficit  Psych: Cooperative      Labs     Recent Labs     08/14/24  1828   WBC 10.92   RBC 4.05*   HGB 11.0*   HCT 33.9*   MCV 83.7   MCH 27.2   MCHC 32.4*   RDW 13.9        No results for input(s): "PROTIME", "INR", "PTT", "D-DIMER", "FERRITIN", "IRON", "TRANS", "TIBC", "LABIRON", "FTVOFLMM24", "FOLATE", "LDH", "HAPTOGLOBIN", "RETICCNTAUTO", "RETABS", "PERIPSMEAREV" in the last 72 hours.   Recent Labs     08/14/24  1828      K 3.8   CO2 21*   BUN 24.1*   CREATININE 2.37*   EGFRNORACEVR 24   GLUCOSE 99   CALCIUM 9.2   ALBUMIN 4.0   GLOBULIN 3.7*   ALKPHOS 163*   ALT 21   AST 28   BILITOT 0.4     No results for input(s): "LACTIC" in the last 72 hours.  Recent Labs     08/14/24  1527   *        Microbiology Results (last 7 days)       Procedure Component Value Units Date/Time    Urine culture [[id number]] Collected: 08/14/24 9436    Order Status: Sent Specimen: Urine Updated: 08/14/24 1829           Imaging     CT Head Without Contrast   Final Result   Abnormal      Some loss of gray-white differentiation in the frontal and parietal regions.  MRI correlation is " recommended.      This report was flagged in Epic as abnormal.         Electronically signed by: Gordo Allen   Date:    08/14/2024   Time:    18:02        Assessment & Plan   Seizure like activity  Acute kidney injury    History:  GERD, IBS, hypothyroidism, BRAULIO, anxiety/depression, chronic low back pain     Plan:  -MRI brain wo contrast pending  -EEG pending  -Neurology consult placed  -Fall/Seizure precautions  -Telemetry monitoring  -LR @ 125 ml/hr  -Monitor Creatinine  -Avoid nephrotoxic medications  -Resume home meds as appropriate  -Labs in AM       VTE Prophylaxis: SCDs, Lovenox       I, Rachel Nunez, MARA have reviewed and discussed this case with Dr. Cisse.  Please see addendum for further assessment and plan from attending MD.

## 2024-08-15 NOTE — HPI
53-year-old female with PMH GERD, IBS, hypothyroidism, BRAULIO, anxiety/depression, chronic back pain who presented to ED on 08/14 with seizure-like activity.  Patient describes episode as full body shaking, loss of consciousness, with associated tongue biting and urinary incontinence lasting aprox 1-2 mins, and was followed by postictal confusion per patient's 's report.  Patient's  at bedside reports several episodes over the last 2 days PTA.  Denies h/o seizures.  Reports recently had several tooth extractions with bone graft placement, was taking narcotics for pain medication, and amoxicillin following procedure.      CT head without revealed loss of gray-white matter differentiation to bilateral frontoparietal regions.  Labs significant for normocytic anemia, BUN/creatinine 23.1/1.62, , and otherwise unremarkable.

## 2024-08-15 NOTE — PROGRESS NOTES
[facility] Hospital Medicine Progress Note        Chief Complaint: Inpatient Follow-up for seizures vs panic attacks     HPI:   Ms. Gandara is a 53 year old female with a pmh of GERD, IBS, hypothyroidism, BRAULIO, anxiety/depression, chronic low back pain who presented to the ED with seizure like activity.  She states that it started occurring yesterday. She states that it involves all 4 extremities, that she is awake and alert during the process. She states that these seizures only last a minute or two and has urinated on herself and bitten her tongue when it happens. Denies any post-ictal symptoms.     ED vitals on arrival:  Temp 98.3° F, pulse 98, resp 22, /65, SpO2 94% on RA.  Today's ED lab work revealed H&H 11/33.9, CO2 21, BUN/CR 24.1/2.37, , UA positive for opiates.  CT head without contrast showed some loss of gray-white differentiation in the frontal and parietal regions.  MRI correlation is recommended.  She was given Keppra, droperidol, Valium, and fluids in the ED.  She was admitted to Hospital Medicine for management.       Interval Hx:   Patient awake and crying, Per  she is having an episode. She is able to talk during this episode. States she is on Methadone for back pain. No fever, chills or SOB.    Family at bedside, explained in detail about the patients condition, diagnosis, vitals, labs and treatment plan. They understand and agree with the plan. All their questions were answered.      At home she walks with a walker or uses a wheel chair     Case was discussed with patient's nurse and  on the floor.    Objective/physical exam:  General: In no acute distress, obese   Chest: Clear to auscultation bilaterally  Heart: RRR, +S1, S2, no appreciable murmur  Abdomen: Soft, nontender, BS +  Neurologic: Alert and oriented x4, Cranial nerve II-XII intact, Strength 5/5 in all 4 extremities  Anxious and having no specific tremors     VITAL SIGNS: 24  HRS MIN & MAX LAST   Temp  Min: 97.5 °F (36.4 °C)  Max: 98.3 °F (36.8 °C) 98 °F (36.7 °C)   BP  Min: 98/66  Max: 123/84 120/76   Pulse  Min: 74  Max: 98  74   Resp  Min: 16  Max: 26 18   SpO2  Min: 90 %  Max: 97 % (!) 93 %     I have reviewed the following labs:  Recent Labs   Lab 08/14/24  1828 08/15/24  0607   WBC 10.92 6.18   RBC 4.05* 3.56*   HGB 11.0* 9.8*   HCT 33.9* 30.7*   MCV 83.7 86.2   MCH 27.2 27.5   MCHC 32.4* 31.9*   RDW 13.9 14.1    157   MPV 9.5 9.5     Recent Labs   Lab 08/14/24  1828 08/15/24  0607    143   K 3.8 3.5    110*   CO2 21* 20*   BUN 24.1* 23.1*   CREATININE 2.37* 1.62*   CALCIUM 9.2 9.0   MG  --  2.20   ALBUMIN 4.0 3.6   ALKPHOS 163* 150   ALT 21 17   AST 28 23   BILITOT 0.4 0.4     Microbiology Results (last 7 days)       Procedure Component Value Units Date/Time    Urine culture [[id number]] Collected: 08/14/24 1757    Order Status: Completed Specimen: Urine Updated: 08/15/24 0625     Urine Culture No Growth At 24 Hours             See below for Radiology    Assessment/Plan:  Panic attack vs seizures   H/O anxiety and depression   ARF  Obesity   Chronic methadone use     History:  GERD, IBS, hypothyroidism, BRAULIO, anxiety/depression, chronic low back pain     Plan:  Patient was having an episode with generalized non specific tremors  Was awake during the episode and then started to answer questions regarding her pain meds   She is on chronic narcotics for back pain  Work up so far negative     Consult psych team for possible panic attacks     Seen by neuro team and started on keppra    EEG pending     MRI under anesthesia pending     Continue strict aspiration, fall and decubitus precautions      Renal functions improved, Continue iv fluids     BUN 23, Crt 1.62    Continue supportive care     VTE prophylaxis: Lovenox     Patient condition:  Fair    Anticipated discharge and Disposition:   Home       All diagnosis and differential diagnosis have been reviewed;  assessment and plan has been documented; I have personally reviewed the labs and test results that are presently available; I have reviewed the patients medication list; I have reviewed the consulting providers response and recommendations. I have reviewed or attempted to review medical records based upon their availability    All of the patient's questions have been  addressed and answered. Patient's is agreeable to the above stated plan. I will continue to monitor closely and make adjustments to medical management as needed.    Portions of this note dictated using EMR integrated voice recognition software, and may be subject to voice recognition errors not corrected at proofreading. Please contact writer for clarification if needed.   _____________________________________________________________________    Malnutrition Status:    Scheduled Med:   busPIRone  30 mg Oral BID    cyanocobalamin  100 mcg Oral Daily    cyclobenzaprine  10 mg Oral TID    docusate sodium  100 mg Oral BID    enoxparin  40 mg Subcutaneous Daily    gabapentin  800 mg Oral TID    levETIRAcetam (Keppra) IV (PEDS and ADULTS)  500 mg Intravenous Q12H    levothyroxine  25 mcg Oral Before breakfast    methadone  10 mg Oral QID    multivitamin  1 tablet Oral Daily    pantoprazole  20 mg Oral BID    pravastatin  10 mg Oral QHS    propranoloL  10 mg Oral BID    sertraline  100 mg Oral BID    topiramate  50 mg Oral QHS      Continuous Infusions:   lactated ringers   Intravenous Continuous 125 mL/hr at 08/15/24 0933 New Bag at 08/15/24 0933      PRN Meds:    Current Facility-Administered Medications:     acetaminophen, 650 mg, Oral, Q6H PRN    aluminum-magnesium hydroxide-simethicone, 30 mL, Oral, QID PRN    LORazepam, 1 mg, Oral, Q6H PRN    melatonin, 6 mg, Oral, Nightly PRN    naloxone, 0.02 mg, Intravenous, PRN    ondansetron, 4 mg, Intravenous, Q4H PRN    polyethylene glycol, 17 g, Oral, BID PRN    prochlorperazine, 5 mg, Intravenous, Q6H PRN     simethicone, 1 tablet, Oral, QID PRN     Radiology:  I have personally reviewed the following imaging and agree with the radiologist.     CT Head Without Contrast  Narrative: EXAMINATION:  CT HEAD WITHOUT CONTRAST    CLINICAL HISTORY:  Mental status change, unknown cause;    TECHNIQUE:  Multiple axial images were obtained from the base of the brain to the vertex without contrast administration.  Sagittal and coronal reconstructions were performed. .Automatic exposure control  (AEC) is utilized to reduce patient radiation exposure.    COMPARISON:  04/02/2017    FINDINGS:  There appears to be some loss of the gray-white differentiation in the frontal and parietal regions bilaterally.  MRI correlation is recommended.  No hemorrhage is seen.  No mass is seen.  Posterior fossa appears grossly unremarkable.    Calvarium is intact.  Paranasal sinuses appear grossly unremarkable.  Impression: Some loss of gray-white differentiation in the frontal and parietal regions.  MRI correlation is recommended.    This report was flagged in Epic as abnormal.    Electronically signed by: Gordo Allen  Date:    08/14/2024  Time:    18:02      Alexandro Chester MD  Department of Hospital Medicine   [facility]   08/15/2024

## 2024-08-15 NOTE — ASSESSMENT & PLAN NOTE
Await EEG results  MRI brain w w/o pending  Consulting anesthesia for MRI (all previous MRIs done under general anesthesia)  Seizure precautions  Started on keppra 500 mg BID  Give Ativan 2 mg IV for witnessed clinical seizures

## 2024-08-15 NOTE — ED PROVIDER NOTES
Encounter Date: 8/14/2024    SCRIBE #1 NOTE: I, Naida Hernandez, am scribing for, and in the presence of,  Zahida Engel DO. I have scribed the following portions of the note - Other sections scribed: HPI, ROS, PE.       History     Chief Complaint   Patient presents with    Shaking     Pt co body shaking since yesterday with slurred speech (since yesterday morning). Pt has hx of minor tremors. Pt AAOx4. Last night pt was trying to get to bathroom and fell. Pt has bruising on arms and legs. Pt seen at [facility] earlier today.      The patient is a 53 year old female with a history of tremors, thyroid disease, and anxiety presenting to the ED with full body shakes onset yesterday afternoon. The patient complains of an episode of slurred speech and urinary incontinence; she denies a history of seizures. The patient reports that since yesterday afternoon, she's had episodes where her whole body starts shaking and she can't control her body. She notes that she's awake during this episodes, but her  reports that there are some episodes where she is not awake. The patient's  at bedside reports that her heart rate spiked into the 200s during one of the episodes while in the ED. The patient reports that she has a history of mild tremors, but has never experienced episodes like these.     The history is provided by the patient and the spouse. No  was used.     Review of patient's allergies indicates:   Allergen Reactions    Diclofenac      Other reaction(s): nausea, vomiting    Nsaids (non-steroidal anti-inflammatory drug)     Toradol [ketorolac] Nausea Only     severe     Past Medical History:   Diagnosis Date    Anxiety     Chronic lower back pain     Depression     GERD (gastroesophageal reflux disease)     IBS (irritable bowel syndrome)     Left knee pain, unspecified chronicity     Neuropathy     Obesity     Personal history of fall     Sleep apnea     Will need Bi Pap does not have yet     Swelling of left knee joint     Thyroid disease     Tremors of nervous system      Past Surgical History:   Procedure Laterality Date    ADENOIDECTOMY      CERVICAL SPINE SURGERY      C5, C6 with plates and screws     SECTION      X1    CHOLECYSTECTOMY      DISC REMOVAL      FRACTURE SURGERY Right     wrist    KNEE ARTHROSCOPY W/ MENISCECTOMY Left 2023    Procedure: ARTHROSCOPY, KNEE, WITH MENISCECTOMY (LMT);  Surgeon: Britton Maya MD;  Location: [facility] OR;  Service: Orthopedics;  Laterality: Left;  partial lateral meniscectomy    LAPAROSCOPIC SLEEVE GASTRECTOMY      MAGNETIC RESONANCE IMAGING N/A 5/15/2023    Procedure: MRI (Magnetic Resonance Imagine);  Surgeon: Judy Garvin MD;  Location: [facility] OR;  Service: Anesthesiology;  Laterality: N/A;  MRI KNEE W/O CONTRAST WITH ANESTHESIA    OOPHORECTOMY      ONE OVARY    ROBOTIC ABLATION OR EXCISION, ENDOMETRIOSIS      SPINAL FUSION      TONSILLECTOMY       Family History   Problem Relation Name Age of Onset    Diabetes Mother      Heart disease Father      Stroke Father       Social History     Tobacco Use    Smoking status: Never    Smokeless tobacco: Never   Substance Use Topics    Alcohol use: Not Currently    Drug use: Not Currently     Comment: pain management     Review of Systems   Genitourinary:         Urinary incontinence.    Neurological:  Positive for tremors and speech difficulty.   All other systems reviewed and are negative.      Physical Exam     Initial Vitals [24 1727]   BP Pulse Resp Temp SpO2   118/65 98 (!) 22 98.3 °F (36.8 °C) (!) 94 %      MAP       --         Physical Exam    Nursing note and vitals reviewed.  Constitutional: She appears well-developed and well-nourished. She is not diaphoretic. She does not appear ill. No distress.   HENT:   Head: Normocephalic and atraumatic.   Right Ear: External ear normal.   Left Ear: External ear normal.   Mouth/Throat: Oropharynx is clear and moist.   Eyes: Conjunctivae are  normal.   Neck: Neck supple. No tracheal deviation present.   Cardiovascular:  Normal rate, regular rhythm and normal heart sounds.           No murmur heard.  Pulmonary/Chest: Breath sounds normal. No respiratory distress. She has no wheezes. She has no rhonchi. She has no rales.   Abdominal: Abdomen is soft. She exhibits no distension. There is no abdominal tenderness.   No right CVA tenderness.  No left CVA tenderness.   Musculoskeletal:         General: Normal range of motion.      Cervical back: Neck supple.     Neurological: She is alert and oriented to person, place, and time. GCS score is 15. GCS eye subscore is 4. GCS verbal subscore is 5. GCS motor subscore is 6.   Tremors to the bilateral extremities. While in the exam, after the  walked into the room, the patient began violently shaking all over and yelling.    Skin: Skin is warm and dry. Capillary refill takes less than 2 seconds. No rash noted. No pallor.         ED Course   Procedures  Labs Reviewed   URINALYSIS, REFLEX TO URINE CULTURE - Abnormal       Result Value    Color, UA Light-Yellow      Appearance, UA Turbid (*)     Specific Gravity, UA 1.011      pH, UA 5.5      Protein, UA 1+ (*)     Glucose, UA Trace (*)     Ketones, UA Negative      Blood, UA 1+ (*)     Bilirubin, UA Negative      Urobilinogen, UA Normal      Nitrites, UA Negative      Leukocyte Esterase, UA Negative      RBC, UA 6-10 (*)     WBC, UA 11-20 (*)     Bacteria, UA Trace      Squamous Epithelial Cells, UA Trace      Mucous, UA Trace (*)    COMPREHENSIVE METABOLIC PANEL - Abnormal    Sodium 139      Potassium 3.8      Chloride 103      CO2 21 (*)     Glucose 99      Blood Urea Nitrogen 24.1 (*)     Creatinine 2.37 (*)     Calcium 9.2      Protein Total 7.7      Albumin 4.0      Globulin 3.7 (*)     Albumin/Globulin Ratio 1.1      Bilirubin Total 0.4       (*)     ALT 21      AST 28      eGFR 24      Anion Gap 15.0      BUN/Creatinine Ratio 10     CBC WITH  DIFFERENTIAL - Abnormal    WBC 10.92      RBC 4.05 (*)     Hgb 11.0 (*)     Hct 33.9 (*)     MCV 83.7      MCH 27.2      MCHC 32.4 (*)     RDW 13.9      Platelet 175      MPV 9.5      Neut % 73.0      Lymph % 15.1      Mono % 9.7      Eos % 1.4      Basophil % 0.5      Lymph # 1.65      Neut # 7.97      Mono # 1.06      Eos # 0.15      Baso # 0.06      IG# 0.03      IG% 0.3      NRBC% 0.0      IPF 1.8     DRUG SCREEN, URINE (BEAKER) - Abnormal    Amphetamines, Urine Negative      Barbiturates, Urine Negative      Benzodiazepine, Urine Negative      Cannabinoids, Urine Negative      Cocaine, Urine Negative      Fentanyl, Urine Negative      MDMA, Urine Negative      Opiates, Urine Positive (*)     Phencyclidine, Urine Negative      pH, Urine 5.5      Specific Gravity, Urine Auto 1.011      Narrative:     Cut off concentrations:    Amphetamines - 1000 ng/ml  Barbiturates - 200 ng/ml  Benzodiazepine - 200 ng/ml  Cannabinoids (THC) - 50 ng/ml  Cocaine - 300 ng/ml  Fentanyl - 1.0 ng/ml  MDMA - 500 ng/ml  Opiates - 300 ng/ml   Phencyclidine (PCP) - 25 ng/ml    Specimen submitted for drug analysis and tested for pH and specific gravity in order to evaluate sample integrity. Suspect tampering if specific gravity is <1.003 and/or pH is not within the range of 4.5 - 8.0  False negatives may result form substances such as bleach added to urine.  False positives may result for the presence of a substance with similar chemical structure to the drug or its metabolite.    This test provides only a PRELIMINARY analytical test result. A more specific alternate chemical method must be used in order to obtain a confirmed analytical result. Gas chromatography/mass spectrometry (GC/MS) is the preferred confirmatory method. Other chemical confirmation methods are available. Clinical consideration and professional judgement should be applied to any drug of abuse test result, particularly when preliminary positive results are  used.    Positive results will be confirmed only at the physicians request. Unconfirmed screening results are to be used only for medical purposes (treatment).        PREGNANCY TEST, URINE RAPID - Normal    hCG Qualitative, Urine Negative     CULTURE, URINE   CBC W/ AUTO DIFFERENTIAL    Narrative:     The following orders were created for panel order CBC auto differential.  Procedure                               Abnormality         Status                     ---------                               -----------         ------                     CBC with Differential[[id number]]       Abnormal            Final result                 Please view results for these tests on the individual orders.          Imaging Results               CT Head Without Contrast (Final result)  Result time 08/14/24 18:02:57      Final result by Lucas Allen MD (08/14/24 18:02:57)                   Impression:      Some loss of gray-white differentiation in the frontal and parietal regions.  MRI correlation is recommended.    This report was flagged in Epic as abnormal.      Electronically signed by: Gordo Allen  Date:    08/14/2024  Time:    18:02               Narrative:    EXAMINATION:  CT HEAD WITHOUT CONTRAST    CLINICAL HISTORY:  Mental status change, unknown cause;    TECHNIQUE:  Multiple axial images were obtained from the base of the brain to the vertex without contrast administration.  Sagittal and coronal reconstructions were performed. .Automatic exposure control  (AEC) is utilized to reduce patient radiation exposure.    COMPARISON:  04/02/2017    FINDINGS:  There appears to be some loss of the gray-white differentiation in the frontal and parietal regions bilaterally.  MRI correlation is recommended.  No hemorrhage is seen.  No mass is seen.  Posterior fossa appears grossly unremarkable.    Calvarium is intact.  Paranasal sinuses appear grossly unremarkable.                                       Medications    melatonin tablet 6 mg (has no administration in time range)   ondansetron injection 4 mg (has no administration in time range)   prochlorperazine injection Soln 5 mg (has no administration in time range)   polyethylene glycol packet 17 g (has no administration in time range)   acetaminophen tablet 650 mg (has no administration in time range)   simethicone chewable tablet 80 mg (has no administration in time range)   aluminum-magnesium hydroxide-simethicone 200-200-20 mg/5 mL suspension 30 mL (has no administration in time range)   naloxone 0.4 mg/mL injection 0.02 mg (has no administration in time range)   lactated ringers infusion (has no administration in time range)   enoxaparin injection 40 mg (has no administration in time range)   lactated ringers bolus 1,000 mL (0 mLs Intravenous Stopped 8/14/24 2059)   droPERidol injection 0.625 mg (0.625 mg Intravenous Given 8/14/24 1959)   levETIRAcetam in NaCl (iso-os) IVPB 1,000 mg (0 mg Intravenous Stopped 8/14/24 2155)   diazePAM tablet 2 mg (2 mg Oral Given 8/14/24 2124)     Medical Decision Making  The differential diagnosis includes, but is not limited to, seizures, MS, electrolyte abnormality, brain tumor, atypical migraine, CVA, neuromuscular disorder    Labs with mild creatinine elevation  CT with loss of gray/white differentiation  Will admit for MRI and neuro eval     Problems Addressed:  Abnormal CT of brain: acute illness or injury that poses a threat to life or bodily functions  Episode of shaking: acute illness or injury that poses a threat to life or bodily functions  Seizure-like activity: acute illness or injury that poses a threat to life or bodily functions    Amount and/or Complexity of Data Reviewed  Independent Historian: spouse     Details: Her  reports that there are some episodes where she is not awake. The patient's  at bedside reports that her heart rate spiked into the 200s during one of the episodes while in the ED.   Labs:  ordered. Decision-making details documented in ED Course.  Radiology: ordered. Decision-making details documented in ED Course.    Risk  Prescription drug management.  Decision regarding hospitalization.            Scribe Attestation:   Scribe #1: I performed the above scribed service and the documentation accurately describes the services I performed. I attest to the accuracy of the note.    Attending Attestation:           Physician Attestation for Scribe:  Physician Attestation Statement for Scribe #1: I, Zahida Engel, DO, reviewed documentation, as scribed by Naida Hernandez in my presence, and it is both accurate and complete.                                    Clinical Impression:  Final diagnoses:  [R25.1] Episode of shaking  [R56.9] Seizure-like activity (Primary)  [R90.89] Abnormal CT of brain          ED Disposition Condition    Admit Stable                Zahida Engel MD  08/15/24 0019

## 2024-08-15 NOTE — SUBJECTIVE & OBJECTIVE
Past Medical History:   Diagnosis Date    Anxiety     Chronic lower back pain     Depression     GERD (gastroesophageal reflux disease)     IBS (irritable bowel syndrome)     Left knee pain, unspecified chronicity     Neuropathy     Obesity     Personal history of fall     Sleep apnea     Will need Bi Pap does not have yet    Swelling of left knee joint     Thyroid disease     Tremors of nervous system        Past Surgical History:   Procedure Laterality Date    ADENOIDECTOMY      CERVICAL SPINE SURGERY      C5, C6 with plates and screws     SECTION      X1    CHOLECYSTECTOMY      DISC REMOVAL      FRACTURE SURGERY Right     wrist    KNEE ARTHROSCOPY W/ MENISCECTOMY Left 2023    Procedure: ARTHROSCOPY, KNEE, WITH MENISCECTOMY (LMT);  Surgeon: Britton Maya MD;  Location: [facility] OR;  Service: Orthopedics;  Laterality: Left;  partial lateral meniscectomy    LAPAROSCOPIC SLEEVE GASTRECTOMY      MAGNETIC RESONANCE IMAGING N/A 5/15/2023    Procedure: MRI (Magnetic Resonance Imagine);  Surgeon: Judy Garvin MD;  Location: [facility] OR;  Service: Anesthesiology;  Laterality: N/A;  MRI KNEE W/O CONTRAST WITH ANESTHESIA    OOPHORECTOMY      ONE OVARY    ROBOTIC ABLATION OR EXCISION, ENDOMETRIOSIS      SPINAL FUSION      TONSILLECTOMY         Review of patient's allergies indicates:   Allergen Reactions    Diclofenac      Other reaction(s): nausea, vomiting    Nsaids (non-steroidal anti-inflammatory drug)     Toradol [ketorolac] Nausea Only     severe       Current Neurological Medications:     No current facility-administered medications on file prior to encounter.     Current Outpatient Medications on File Prior to Encounter   Medication Sig    biotin-keratin 10,000-100 mcg-mg Tab Take 1 capsule by mouth every morning.    busPIRone (BUSPAR) 30 MG Tab Take 30 mg by mouth 2 (two) times daily.    cevimeline (EVOXAC) 30 mg capsule Take 30 mg by mouth 3 (three) times daily.    cyanocobalamin (VITAMIN B-12) 100  MCG tablet Take 1 capsule by mouth every morning.    cyclobenzaprine (FLEXERIL) 10 MG tablet Take 10 mg by mouth 3 (three) times daily.    docusate sodium (COLACE) 100 MG capsule Take 100 mg by mouth 2 (two) times daily.    furosemide (LASIX) 20 MG tablet Take 20 mg by mouth every morning.    gabapentin (NEURONTIN) 800 MG tablet Take 800 mg by mouth 3 (three) times daily.    HYDROcodone-acetaminophen (NORCO)  mg per tablet 1 tablet every 4 (four) hours as needed.    levothyroxine (SYNTHROID) 25 MCG tablet Take 25 mcg by mouth before breakfast.    methadone (DOLOPHINE) 10 MG tablet Take 10 mg by mouth 4 (four) times daily.    multivitamin (THERAGRAN) per tablet Take 1 tablet by mouth 2 (two) times a day.    ondansetron (ZOFRAN-ODT) 8 MG TbDL 8 mg as needed.    pantoprazole (PROTONIX) 40 MG tablet Take 20 mg by mouth 2 (two) times a day.    pravastatin (PRAVACHOL) 10 MG tablet Take 10 mg by mouth every evening.    propranoloL (INDERAL) 20 MG tablet Take 10 mg by mouth 2 (two) times daily.    senna (SENOKOT) 8.6 mg tablet Take 17.2 mg by mouth every evening.    sertraline (ZOLOFT) 100 MG tablet Take 100 mg by mouth 2 (two) times a day.    topiramate (TROKENDI XR) 50 mg Cp24 Take 1 capsule by mouth every evening.    aspirin (ECOTRIN) 81 MG EC tablet Take 81 mg by mouth every evening. Stopped 6/7/23    calcium carbonate (OS-HANNAH) 600 mg calcium (1,500 mg) Tab Take 600 mg by mouth 2 (two) times a day.    dicyclomine (BENTYL) 10 MG capsule Take 10 mg by mouth as needed.    HYDROcodone-acetaminophen (NORCO)  mg per tablet Take 1 tablet by mouth every 6 (six) hours as needed for Pain.    trospium (SANCTURA) 20 mg Tab tablet Take 20 mg by mouth 2 (two) times daily.     Family History       Problem Relation (Age of Onset)    Diabetes Mother    Heart disease Father    Stroke Father          Tobacco Use    Smoking status: Never    Smokeless tobacco: Never   Substance and Sexual Activity    Alcohol use: Not Currently     Drug use: Not Currently     Comment: pain management    Sexual activity: Not on file     Review of Systems  A 14pt ros was reviewed & is negative unless o/w documented in the hpi    Objective:     Vital Signs (Most Recent):  Temp: 98 °F (36.7 °C) (08/15/24 0754)  Pulse: 74 (08/15/24 0754)  Resp: 18 (08/15/24 0754)  BP: 120/76 (08/15/24 0935)  SpO2: (!) 93 % (08/15/24 0754) Vital Signs (24h Range):  Temp:  [97.5 °F (36.4 °C)-98.3 °F (36.8 °C)] 98 °F (36.7 °C)  Pulse:  [74-98] 74  Resp:  [16-26] 18  SpO2:  [90 %-97 %] 93 %  BP: ()/(46-84) 120/76     Weight: 111.1 kg (244 lb 14.9 oz)  Body mass index is 42.04 kg/m².     Physical Exam  Vitals reviewed.   Constitutional:       General: She is awake.   HENT:      Head: Normocephalic.      Nose: Nose normal.      Mouth/Throat:      Mouth: Mucous membranes are moist.      Pharynx: Oropharynx is clear.   Eyes:      Extraocular Movements: Extraocular movements intact and EOM normal.      Pupils: Pupils are equal, round, and reactive to light.   Pulmonary:      Effort: Pulmonary effort is normal.   Skin:     General: Skin is warm and dry.   Neurological:      Mental Status: She is alert and oriented to person, place, and time.      Motor: Motor strength is normal.  Psychiatric:         Speech: Speech normal.         Behavior: Behavior is cooperative.          NEUROLOGICAL EXAMINATION:     MENTAL STATUS   Oriented to person, place, and time.   Follows 3 step commands.   Attention: normal. Concentration: normal.   Speech: speech is normal   Level of consciousness: alert  Knowledge: good.   Normal comprehension.     CRANIAL NERVES     CN II   Visual fields full to confrontation.     CN III, IV, VI   Pupils are equal, round, and reactive to light.  Extraocular motions are normal.   Nystagmus: none   Conjugate gaze: present    CN V   Facial sensation intact.     CN VII   Facial expression full, symmetric.     CN VIII   CN VIII normal.     CN XI   CN XI normal.     MOTOR  EXAM   Muscle bulk: normal  Overall muscle tone: normal  Right arm pronator drift: absent  Left arm pronator drift: absent    Strength   Strength 5/5 throughout.     SENSORY EXAM   Light touch normal.       Significant Labs:   Recent Lab Results  (Last 5 results in the past 24 hours)        08/15/24  0607   08/14/24  1953   08/14/24  1828   08/14/24  1757   08/14/24  1527        Phencyclidine   Negative             Immature Platelet Fraction 1.9     1.8           Albumin/Globulin Ratio 1.3     1.1           Albumin 3.6     4.0                163           ALT 17     21           Amphetamines, Urine   Negative             Anion Gap 13.0     15.0           Appearance, UA       Turbid         AST 23     28           Bacteria, UA       Trace         Barbituates, Urine   Negative             Baso # 0.04     0.06           Basophil % 0.6     0.5           Benzodiazepine, Urine   Negative             Bilirubin (UA)       Negative         BILIRUBIN TOTAL 0.4     0.4           BUN 23.1     24.1           BUN/CREAT RATIO 14     10           Calcium 9.0     9.2           Cannabinoids, Urine   Negative             Chloride 110     103           CO2 20     21           Cocaine, Urine   Negative             Color, UA       Light-Yellow         CPK         219       Creatinine 1.62     2.37           eGFR 38     24           Eos # 0.12     0.15           Eos % 1.9     1.4           Fentanyl, Urine   Negative             Globulin, Total 2.7     3.7           Glucose 101     99           Glucose, UA       Trace         Hematocrit 30.7     33.9           Hemoglobin 9.8     11.0           Immature Grans (Abs) 0.03     0.03           Immature Granulocytes 0.5     0.3           Ketones, UA       Negative         Leukocyte Esterase, UA       Negative         Lymph # 1.05     1.65           LYMPH % 17.0     15.1           Magnesium  2.20         2.1       MCH 27.5     27.2           MCHC 31.9     32.4           MCV 86.2     83.7            MDMA, Urine   Negative             Mono # 0.70     1.06           Mono % 11.3     9.7           MPV 9.5     9.5           Mucous, UA       Trace         Neut # 4.24     7.97           Neut % 68.7     73.0           NITRITE UA       Negative         nRBC 0.0     0.0           Blood, UA       1+         Opiates, Urine   Positive             pH, UA       5.5         pH, Urine   5.5             Phosphorus Level 5.0         5.8       Platelet Count 157     175           Potassium 3.5     3.8           hCG Qualitative, Urine       Negative         PROTEIN TOTAL 6.3     7.7           Protein, UA       1+         RBC 3.56     4.05           RBC, UA       6-10         RDW 14.1     13.9           Sodium 143     139           Specific Gravity,UA       1.011         Specific Gravity, Urine Auto   1.011             Squamous Epithelial Cells, UA       Trace         Urine Culture       No Growth At 24 Hours  [P]         Urobilinogen, UA       Normal         WBC, UA       11-20         WBC 6.18     10.92                                   [P] - Preliminary Result               Significant Imaging:  CT head w/o:  FINDINGS:  There appears to be some loss of the gray-white differentiation in the frontal and parietal regions bilaterally.  MRI correlation is recommended.  No hemorrhage is seen.  No mass is seen.  Posterior fossa appears grossly unremarkable.     Calvarium is intact.  Paranasal sinuses appear grossly unremarkable.     Impression:     Some loss of gray-white differentiation in the frontal and parietal regions.  MRI correlation is recommended.     I have reviewed all pertinent imaging results/findings within the past 24 hours.

## 2024-08-15 NOTE — CONSULTS
[facility] - Observation Unit  Neurology  Consult Note    Patient Name: Jordan Gandara  MRN: [id number]  Admission Date: 8/14/2024  Hospital Length of Stay: 1 days  Code Status: Full Code   Attending Provider: Wesly Cisse MD   Consulting Provider: Ericka Garcia [facility]  Primary Care Physician: Hanna Benitez MD  Principal Problem:Seizure-like activity    Inpatient consult to Neurology  Consult performed by: Ericka Garcia FNP  Consult ordered by: Ericka Garcia FNP         Subjective:     Chief Complaint:       HPI:   53-year-old female with PMH GERD, IBS, hypothyroidism, BRAULIO, anxiety/depression, chronic back pain who presented to ED on 08/14 with seizure-like activity.  Patient describes episode as full body shaking, loss of consciousness, with associated tongue biting and urinary incontinence lasting aprox 1-2 mins, and was followed by postictal confusion per patient's 's report.  Patient's  at bedside reports several episodes over the last 2 days PTA.  Denies h/o seizures.  Reports recently had several tooth extractions with bone graft placement, was taking narcotics for pain medication, and amoxicillin following procedure.      CT head without revealed loss of gray-white matter differentiation to bilateral frontoparietal regions.  Labs significant for normocytic anemia, BUN/creatinine 23.1/1.62, , and otherwise unremarkable.     Past Medical History:   Diagnosis Date    Anxiety     Chronic lower back pain     Depression     GERD (gastroesophageal reflux disease)     IBS (irritable bowel syndrome)     Left knee pain, unspecified chronicity     Neuropathy     Obesity     Personal history of fall     Sleep apnea     Will need Bi Pap does not have yet    Swelling of left knee joint     Thyroid disease     Tremors of nervous system        Past Surgical History:   Procedure Laterality Date    ADENOIDECTOMY      CERVICAL SPINE SURGERY      C5, C6 with plates and screws      SECTION      X1    CHOLECYSTECTOMY      DISC REMOVAL      FRACTURE SURGERY Right     wrist    KNEE ARTHROSCOPY W/ MENISCECTOMY Left 2023    Procedure: ARTHROSCOPY, KNEE, WITH MENISCECTOMY (LMT);  Surgeon: Britton Maya MD;  Location: [facility] OR;  Service: Orthopedics;  Laterality: Left;  partial lateral meniscectomy    LAPAROSCOPIC SLEEVE GASTRECTOMY      MAGNETIC RESONANCE IMAGING N/A 5/15/2023    Procedure: MRI (Magnetic Resonance Imagine);  Surgeon: Judy Garvin MD;  Location: [facility] OR;  Service: Anesthesiology;  Laterality: N/A;  MRI KNEE W/O CONTRAST WITH ANESTHESIA    OOPHORECTOMY      ONE OVARY    ROBOTIC ABLATION OR EXCISION, ENDOMETRIOSIS      SPINAL FUSION      TONSILLECTOMY         Review of patient's allergies indicates:   Allergen Reactions    Diclofenac      Other reaction(s): nausea, vomiting    Nsaids (non-steroidal anti-inflammatory drug)     Toradol [ketorolac] Nausea Only     severe       Current Neurological Medications:     No current facility-administered medications on file prior to encounter.     Current Outpatient Medications on File Prior to Encounter   Medication Sig    biotin-keratin 10,000-100 mcg-mg Tab Take 1 capsule by mouth every morning.    busPIRone (BUSPAR) 30 MG Tab Take 30 mg by mouth 2 (two) times daily.    cevimeline (EVOXAC) 30 mg capsule Take 30 mg by mouth 3 (three) times daily.    cyanocobalamin (VITAMIN B-12) 100 MCG tablet Take 1 capsule by mouth every morning.    cyclobenzaprine (FLEXERIL) 10 MG tablet Take 10 mg by mouth 3 (three) times daily.    docusate sodium (COLACE) 100 MG capsule Take 100 mg by mouth 2 (two) times daily.    furosemide (LASIX) 20 MG tablet Take 20 mg by mouth every morning.    gabapentin (NEURONTIN) 800 MG tablet Take 800 mg by mouth 3 (three) times daily.    HYDROcodone-acetaminophen (NORCO)  mg per tablet 1 tablet every 4 (four) hours as needed.    levothyroxine (SYNTHROID) 25 MCG tablet Take 25 mcg by mouth before  breakfast.    methadone (DOLOPHINE) 10 MG tablet Take 10 mg by mouth 4 (four) times daily.    multivitamin (THERAGRAN) per tablet Take 1 tablet by mouth 2 (two) times a day.    ondansetron (ZOFRAN-ODT) 8 MG TbDL 8 mg as needed.    pantoprazole (PROTONIX) 40 MG tablet Take 20 mg by mouth 2 (two) times a day.    pravastatin (PRAVACHOL) 10 MG tablet Take 10 mg by mouth every evening.    propranoloL (INDERAL) 20 MG tablet Take 10 mg by mouth 2 (two) times daily.    senna (SENOKOT) 8.6 mg tablet Take 17.2 mg by mouth every evening.    sertraline (ZOLOFT) 100 MG tablet Take 100 mg by mouth 2 (two) times a day.    topiramate (TROKENDI XR) 50 mg Cp24 Take 1 capsule by mouth every evening.    aspirin (ECOTRIN) 81 MG EC tablet Take 81 mg by mouth every evening. Stopped 6/7/23    calcium carbonate (OS-HANNAH) 600 mg calcium (1,500 mg) Tab Take 600 mg by mouth 2 (two) times a day.    dicyclomine (BENTYL) 10 MG capsule Take 10 mg by mouth as needed.    HYDROcodone-acetaminophen (NORCO)  mg per tablet Take 1 tablet by mouth every 6 (six) hours as needed for Pain.    trospium (SANCTURA) 20 mg Tab tablet Take 20 mg by mouth 2 (two) times daily.     Family History       Problem Relation (Age of Onset)    Diabetes Mother    Heart disease Father    Stroke Father          Tobacco Use    Smoking status: Never    Smokeless tobacco: Never   Substance and Sexual Activity    Alcohol use: Not Currently    Drug use: Not Currently     Comment: pain management    Sexual activity: Not on file     Review of Systems  A 14pt ros was reviewed & is negative unless o/w documented in the hpi    Objective:     Vital Signs (Most Recent):  Temp: 98 °F (36.7 °C) (08/15/24 0754)  Pulse: 74 (08/15/24 0754)  Resp: 18 (08/15/24 0754)  BP: 120/76 (08/15/24 0935)  SpO2: (!) 93 % (08/15/24 0754) Vital Signs (24h Range):  Temp:  [97.5 °F (36.4 °C)-98.3 °F (36.8 °C)] 98 °F (36.7 °C)  Pulse:  [74-98] 74  Resp:  [16-26] 18  SpO2:  [90 %-97 %] 93 %  BP:  ()/(46-84) 120/76     Weight: 111.1 kg (244 lb 14.9 oz)  Body mass index is 42.04 kg/m².     Physical Exam  Vitals reviewed.   Constitutional:       General: She is awake.   HENT:      Head: Normocephalic.      Nose: Nose normal.      Mouth/Throat:      Mouth: Mucous membranes are moist.      Pharynx: Oropharynx is clear.   Eyes:      Extraocular Movements: Extraocular movements intact and EOM normal.      Pupils: Pupils are equal, round, and reactive to light.   Pulmonary:      Effort: Pulmonary effort is normal.   Skin:     General: Skin is warm and dry.   Neurological:      Mental Status: She is alert and oriented to person, place, and time.      Motor: Motor strength is normal.  Psychiatric:         Speech: Speech normal.         Behavior: tearful          NEUROLOGICAL EXAMINATION:     MENTAL STATUS   Oriented to person, place, and time.   Follows 3 step commands.   Attention: normal. Concentration: normal.   Speech: speech is normal   Level of consciousness: alert  Knowledge: good.   Normal comprehension.     CRANIAL NERVES     CN II   Visual fields full to confrontation.     CN III, IV, VI   Pupils are equal, round, and reactive to light.  Extraocular motions are normal.   Nystagmus: none   Conjugate gaze: present    CN V   Facial sensation intact.     CN VII   Facial expression full, symmetric.     CN VIII   CN VIII normal.     CN XI   CN XI normal.     MOTOR EXAM   Muscle bulk: normal  Overall muscle tone: normal  Right arm pronator drift: absent  Left arm pronator drift: absent    Strength   Moves all extremities  No focal weakness    SENSORY EXAM   Light touch normal.       Significant Labs:   Recent Lab Results  (Last 5 results in the past 24 hours)        08/15/24  0607   08/14/24  1953   08/14/24  1828   08/14/24  1757   08/14/24  1527        Phencyclidine   Negative             Immature Platelet Fraction 1.9     1.8           Albumin/Globulin Ratio 1.3     1.1           Albumin 3.6     4.0                 163           ALT 17     21           Amphetamines, Urine   Negative             Anion Gap 13.0     15.0           Appearance, UA       Turbid         AST 23     28           Bacteria, UA       Trace         Barbituates, Urine   Negative             Baso # 0.04     0.06           Basophil % 0.6     0.5           Benzodiazepine, Urine   Negative             Bilirubin (UA)       Negative         BILIRUBIN TOTAL 0.4     0.4           BUN 23.1     24.1           BUN/CREAT RATIO 14     10           Calcium 9.0     9.2           Cannabinoids, Urine   Negative             Chloride 110     103           CO2 20     21           Cocaine, Urine   Negative             Color, UA       Light-Yellow         CPK         219       Creatinine 1.62     2.37           eGFR 38     24           Eos # 0.12     0.15           Eos % 1.9     1.4           Fentanyl, Urine   Negative             Globulin, Total 2.7     3.7           Glucose 101     99           Glucose, UA       Trace         Hematocrit 30.7     33.9           Hemoglobin 9.8     11.0           Immature Grans (Abs) 0.03     0.03           Immature Granulocytes 0.5     0.3           Ketones, UA       Negative         Leukocyte Esterase, UA       Negative         Lymph # 1.05     1.65           LYMPH % 17.0     15.1           Magnesium  2.20         2.1       MCH 27.5     27.2           MCHC 31.9     32.4           MCV 86.2     83.7           MDMA, Urine   Negative             Mono # 0.70     1.06           Mono % 11.3     9.7           MPV 9.5     9.5           Mucous, UA       Trace         Neut # 4.24     7.97           Neut % 68.7     73.0           NITRITE UA       Negative         nRBC 0.0     0.0           Blood, UA       1+         Opiates, Urine   Positive             pH, UA       5.5         pH, Urine   5.5             Phosphorus Level 5.0         5.8       Platelet Count 157     175           Potassium 3.5     3.8           hCG Qualitative, Urine        Negative         PROTEIN TOTAL 6.3     7.7           Protein, UA       1+         RBC 3.56     4.05           RBC, UA       6-10         RDW 14.1     13.9           Sodium 143     139           Specific Gravity,UA       1.011         Specific Gravity, Urine Auto   1.011             Squamous Epithelial Cells, UA       Trace         Urine Culture       No Growth At 24 Hours  [P]         Urobilinogen, UA       Normal         WBC, UA       11-20         WBC 6.18     10.92                                   [P] - Preliminary Result               Significant Imaging:  CT head w/o:  FINDINGS:  There appears to be some loss of the gray-white differentiation in the frontal and parietal regions bilaterally.  MRI correlation is recommended.  No hemorrhage is seen.  No mass is seen.  Posterior fossa appears grossly unremarkable.     Calvarium is intact.  Paranasal sinuses appear grossly unremarkable.     Impression:     Some loss of gray-white differentiation in the frontal and parietal regions.  MRI correlation is recommended.     I have reviewed all pertinent imaging results/findings within the past 24 hours.  Assessment and Plan:     * Seizure-like activity  Await EEG results  MRI brain w w/o pending  Consulting anesthesia for MRI (all previous MRIs done under general anesthesia)  Seizure precautions  Started on keppra 500 mg BID  Give Ativan 2 mg IV for witnessed clinical seizures            VTE Risk Mitigation (From admission, onward)           Ordered     enoxaparin injection 40 mg  Daily         08/15/24 0012     IP VTE HIGH RISK PATIENT  Once         08/15/24 0003     Place sequential compression device  Until discontinued         08/15/24 0003                    Thank you for your consult.  Further recommendations to follow per MD Ericka Garcia, [facility]  Inpatient Neurology  [facility] - Observation Unit

## 2024-08-15 NOTE — NURSING
Nurses Note -- 4 Eyes      8/15/2024   8:32 AM      Skin assessed during: Admit      [] No Altered Skin Integrity Present    []Prevention Measures Documented      [] Yes- Altered Skin Integrity Present or Discovered   [x] LDA Added if Not in Epic (Describe Wound)   [] New Altered Skin Integrity was Present on Admit and Documented in LDA   [x] Wound Image Taken    Wound Care Consulted? No    Attending Nurse:  Raysa Chery RN/Staff Member:  dov grigsby

## 2024-08-15 NOTE — PLAN OF CARE
08/15/24 1523   Discharge Assessment   Assessment Type Discharge Planning Assessment   Confirmed/corrected address, phone number and insurance Yes   Confirmed Demographics Correct on Facesheet   Source of Information patient;family   Communicated AGNIESZKA with patient/caregiver Date not available/Unable to determine   Reason For Admission Seizures   People in Home spouse;child(aron), adult   Facility Arrived From: Home   Do you expect to return to your current living situation? Yes   Do you have help at home or someone to help you manage your care at home? Yes   Who are your caregiver(s) and their phone number(s)? Spouse, Mike Gandara [phone]   Prior to hospitilization cognitive status: Unable to Assess   Current cognitive status: Alert/Oriented   Walking or Climbing Stairs Difficulty yes   Walking or Climbing Stairs ambulation difficulty, requires equipment   Mobility Management WC and rollator for mobility   Dressing/Bathing Difficulty no   Home Accessibility wheelchair accessible   Home Layout Able to live on 1st floor   Equipment Currently Used at Home BIPAP;wheelchair;rollator   Patient currently being followed by outpatient case management? No   Do you currently have service(s) that help you manage your care at home? No   Do you take prescription medications? Yes   Do you have prescription coverage? Yes   Coverage BCBS   Do you have any problems affording any of your prescribed medications? No   Is the patient taking medications as prescribed? yes   Who is going to help you get home at discharge? Mike Elaine   How do you get to doctors appointments? family or friend will provide   Are you on dialysis? No   Do you take coumadin? No   Discharge Plan A Home   Discharge Plan B Home   DME Needed Upon Discharge  none   Discharge Plan discussed with: Spouse/sig other;Patient   Name(s) and Number(s) Mike Elaine   Transition of Care Barriers None   Physical Activity   On average, how many days per week do  you engage in moderate to strenuous exercise (like a brisk walk)? 0 days   On average, how many minutes do you engage in exercise at this level? 0 min   Financial Resource Strain   How hard is it for you to pay for the very basics like food, housing, medical care, and heating? Not hard   Housing Stability   In the last 12 months, was there a time when you were not able to pay the mortgage or rent on time? N   At any time in the past 12 months, were you homeless or living in a shelter (including now)? N   Transportation Needs   Has the lack of transportation kept you from medical appointments, meetings, work or from getting things needed for daily living? No   Food Insecurity   Within the past 12 months, you worried that your food would run out before you got the money to buy more. Never true   Within the past 12 months, the food you bought just didn't last and you didn't have money to get more. Never true   Stress   Do you feel stress - tense, restless, nervous, or anxious, or unable to sleep at night because your mind is troubled all the time - these days? Very much   Social Isolation   How often do you feel lonely or isolated from those around you?  Never   Alcohol Use   Q1: How often do you have a drink containing alcohol? Monthly or l   Q2: How many drinks containing alcohol do you have on a typical day when you are drinking? 1 or 2   Q3: How often do you have six or more drinks on one occasion? Never   Utilities   In the past 12 months has the electric, gas, oil, or water company threatened to shut off services in your home? No   Health Literacy   How often do you need to have someone help you when you read instructions, pamphlets, or other written material from your doctor or pharmacy? Never   OTHER   Name(s) of People in Home Spouse,  and sonJordan     Assessment completed in patient's room.  Spouse, , at bedside.  He will assist with care and provide transportation at discharge.   PCP-Hanna  Walker  Pharmacy-[facility] in [place]    Patient requires DME in the home.    Has utilized HH serviced in the past but cannot recall name of company    No discharge needs identified at this time.  Will continue to follow.

## 2024-08-16 ENCOUNTER — ANESTHESIA (OUTPATIENT)
Dept: SURGERY | Facility: HOSPITAL | Age: 54
End: 2024-08-16
Payer: COMMERCIAL

## 2024-08-16 ENCOUNTER — ANESTHESIA EVENT (OUTPATIENT)
Dept: SURGERY | Facility: HOSPITAL | Age: 54
End: 2024-08-16
Payer: COMMERCIAL

## 2024-08-16 LAB
ANION GAP SERPL CALC-SCNC: 9 MEQ/L
BACTERIA UR CULT: NO GROWTH
BASOPHILS # BLD AUTO: 0.05 X10(3)/MCL
BASOPHILS NFR BLD AUTO: 1 %
BUN SERPL-MCNC: 12.7 MG/DL (ref 9.8–20.1)
CALCIUM SERPL-MCNC: 8.8 MG/DL (ref 8.4–10.2)
CHLORIDE SERPL-SCNC: 110 MMOL/L (ref 98–107)
CO2 SERPL-SCNC: 23 MMOL/L (ref 22–29)
CREAT SERPL-MCNC: 0.83 MG/DL (ref 0.55–1.02)
CREAT/UREA NIT SERPL: 15
EOSINOPHIL # BLD AUTO: 0.09 X10(3)/MCL (ref 0–0.9)
EOSINOPHIL NFR BLD AUTO: 1.8 %
ERYTHROCYTE [DISTWIDTH] IN BLOOD BY AUTOMATED COUNT: 14.1 % (ref 11.5–17)
GFR SERPLBLD CREATININE-BSD FMLA CKD-EPI: >60 ML/MIN/1.73/M2
GLUCOSE SERPL-MCNC: 83 MG/DL (ref 74–100)
HCT VFR BLD AUTO: 30.8 % (ref 37–47)
HGB BLD-MCNC: 9.9 G/DL (ref 12–16)
IMM GRANULOCYTES # BLD AUTO: 0.03 X10(3)/MCL (ref 0–0.04)
IMM GRANULOCYTES NFR BLD AUTO: 0.6 %
LYMPHOCYTES # BLD AUTO: 1.52 X10(3)/MCL (ref 0.6–4.6)
LYMPHOCYTES NFR BLD AUTO: 30.1 %
MCH RBC QN AUTO: 28 PG (ref 27–31)
MCHC RBC AUTO-ENTMCNC: 32.1 G/DL (ref 33–36)
MCV RBC AUTO: 87 FL (ref 80–94)
MONOCYTES # BLD AUTO: 0.61 X10(3)/MCL (ref 0.1–1.3)
MONOCYTES NFR BLD AUTO: 12.1 %
NEUTROPHILS # BLD AUTO: 2.75 X10(3)/MCL (ref 2.1–9.2)
NEUTROPHILS NFR BLD AUTO: 54.4 %
NRBC BLD AUTO-RTO: 0 %
PLATELET # BLD AUTO: 154 X10(3)/MCL (ref 130–400)
PMV BLD AUTO: 9.7 FL (ref 7.4–10.4)
POTASSIUM SERPL-SCNC: 3.6 MMOL/L (ref 3.5–5.1)
RBC # BLD AUTO: 3.54 X10(6)/MCL (ref 4.2–5.4)
SODIUM SERPL-SCNC: 142 MMOL/L (ref 136–145)
WBC # BLD AUTO: 5.05 X10(3)/MCL (ref 4.5–11.5)

## 2024-08-16 PROCEDURE — 25000003 PHARM REV CODE 250: Performed by: NURSE ANESTHETIST, CERTIFIED REGISTERED

## 2024-08-16 PROCEDURE — 36415 COLL VENOUS BLD VENIPUNCTURE: CPT | Performed by: INTERNAL MEDICINE

## 2024-08-16 PROCEDURE — 11000001 HC ACUTE MED/SURG PRIVATE ROOM

## 2024-08-16 PROCEDURE — 85025 COMPLETE CBC W/AUTO DIFF WBC: CPT | Performed by: INTERNAL MEDICINE

## 2024-08-16 PROCEDURE — 25000003 PHARM REV CODE 250: Performed by: NURSE PRACTITIONER

## 2024-08-16 PROCEDURE — 25000003 PHARM REV CODE 250: Performed by: ANESTHESIOLOGY

## 2024-08-16 PROCEDURE — 63600175 PHARM REV CODE 636 W HCPCS: Performed by: INTERNAL MEDICINE

## 2024-08-16 PROCEDURE — 63600175 PHARM REV CODE 636 W HCPCS: Performed by: NURSE ANESTHETIST, CERTIFIED REGISTERED

## 2024-08-16 PROCEDURE — 25000003 PHARM REV CODE 250: Performed by: INTERNAL MEDICINE

## 2024-08-16 PROCEDURE — 37000009 HC ANESTHESIA EA ADD 15 MINS

## 2024-08-16 PROCEDURE — 80048 BASIC METABOLIC PNL TOTAL CA: CPT | Performed by: INTERNAL MEDICINE

## 2024-08-16 PROCEDURE — 25500020 PHARM REV CODE 255: Performed by: INTERNAL MEDICINE

## 2024-08-16 PROCEDURE — 25000003 PHARM REV CODE 250

## 2024-08-16 PROCEDURE — 37000008 HC ANESTHESIA 1ST 15 MINUTES

## 2024-08-16 PROCEDURE — 63600175 PHARM REV CODE 636 W HCPCS: Performed by: NURSE PRACTITIONER

## 2024-08-16 PROCEDURE — A9577 INJ MULTIHANCE: HCPCS | Performed by: INTERNAL MEDICINE

## 2024-08-16 RX ORDER — HYDROMORPHONE HYDROCHLORIDE 2 MG/ML
0.5 INJECTION, SOLUTION INTRAMUSCULAR; INTRAVENOUS; SUBCUTANEOUS EVERY 5 MIN PRN
Status: DISCONTINUED | OUTPATIENT
Start: 2024-08-16 | End: 2024-08-17 | Stop reason: HOSPADM

## 2024-08-16 RX ORDER — LAMOTRIGINE 25 MG/1
50 TABLET ORAL 2 TIMES DAILY
Status: DISCONTINUED | OUTPATIENT
Start: 2024-08-17 | End: 2024-08-17 | Stop reason: HOSPADM

## 2024-08-16 RX ORDER — PROPOFOL 10 MG/ML
INJECTION, EMULSION INTRAVENOUS
Status: DISCONTINUED | OUTPATIENT
Start: 2024-08-16 | End: 2024-08-16

## 2024-08-16 RX ORDER — LEVETIRACETAM 500 MG/1
500 TABLET ORAL 2 TIMES DAILY
Status: COMPLETED | OUTPATIENT
Start: 2024-08-16 | End: 2024-08-16

## 2024-08-16 RX ORDER — LIDOCAINE HYDROCHLORIDE 10 MG/ML
1 INJECTION, SOLUTION EPIDURAL; INFILTRATION; INTRACAUDAL; PERINEURAL ONCE
Status: CANCELLED | OUTPATIENT
Start: 2024-08-16 | End: 2024-08-16

## 2024-08-16 RX ORDER — LAMOTRIGINE 25 MG/1
50 TABLET ORAL DAILY
Status: DISCONTINUED | OUTPATIENT
Start: 2024-08-16 | End: 2024-08-16

## 2024-08-16 RX ORDER — MIDAZOLAM HYDROCHLORIDE 2 MG/2ML
2 INJECTION, SOLUTION INTRAMUSCULAR; INTRAVENOUS ONCE AS NEEDED
Status: CANCELLED | OUTPATIENT
Start: 2024-08-16 | End: 2036-01-13

## 2024-08-16 RX ORDER — FAMOTIDINE 10 MG/ML
20 INJECTION INTRAVENOUS ONCE
Status: CANCELLED | OUTPATIENT
Start: 2024-08-16 | End: 2024-08-16

## 2024-08-16 RX ORDER — IPRATROPIUM BROMIDE AND ALBUTEROL SULFATE 2.5; .5 MG/3ML; MG/3ML
3 SOLUTION RESPIRATORY (INHALATION) ONCE AS NEEDED
Status: DISCONTINUED | OUTPATIENT
Start: 2024-08-16 | End: 2024-08-17 | Stop reason: HOSPADM

## 2024-08-16 RX ORDER — LEVETIRACETAM 500 MG/1
500 TABLET ORAL 2 TIMES DAILY
Status: DISCONTINUED | OUTPATIENT
Start: 2024-08-16 | End: 2024-08-16

## 2024-08-16 RX ORDER — GLUCAGON 1 MG
1 KIT INJECTION
Status: DISCONTINUED | OUTPATIENT
Start: 2024-08-16 | End: 2024-08-17 | Stop reason: HOSPADM

## 2024-08-16 RX ORDER — ACETAMINOPHEN 500 MG
1000 TABLET ORAL ONCE
Status: CANCELLED | OUTPATIENT
Start: 2024-08-16 | End: 2024-08-16

## 2024-08-16 RX ORDER — LEVETIRACETAM 500 MG/1
500 TABLET ORAL 2 TIMES DAILY
Qty: 60 TABLET | Refills: 11 | Status: SHIPPED | OUTPATIENT
Start: 2024-08-16 | End: 2024-08-17 | Stop reason: HOSPADM

## 2024-08-16 RX ORDER — MIDAZOLAM HYDROCHLORIDE 1 MG/ML
INJECTION INTRAMUSCULAR; INTRAVENOUS
Status: DISCONTINUED | OUTPATIENT
Start: 2024-08-16 | End: 2024-08-16

## 2024-08-16 RX ORDER — SODIUM CHLORIDE 9 MG/ML
INJECTION, SOLUTION INTRAVENOUS CONTINUOUS
Status: CANCELLED | OUTPATIENT
Start: 2024-08-16

## 2024-08-16 RX ORDER — MIRTAZAPINE 15 MG/1
15 TABLET, FILM COATED ORAL NIGHTLY
Qty: 30 TABLET | Refills: 11 | Status: SHIPPED | OUTPATIENT
Start: 2024-08-16 | End: 2025-08-16

## 2024-08-16 RX ORDER — DIPHENHYDRAMINE HYDROCHLORIDE 50 MG/ML
25 INJECTION INTRAMUSCULAR; INTRAVENOUS EVERY 6 HOURS PRN
Status: DISCONTINUED | OUTPATIENT
Start: 2024-08-16 | End: 2024-08-17 | Stop reason: HOSPADM

## 2024-08-16 RX ORDER — HYDROCODONE BITARTRATE AND ACETAMINOPHEN 5; 325 MG/1; MG/1
1 TABLET ORAL
Status: DISCONTINUED | OUTPATIENT
Start: 2024-08-16 | End: 2024-08-17 | Stop reason: HOSPADM

## 2024-08-16 RX ORDER — ONDANSETRON HYDROCHLORIDE 2 MG/ML
4 INJECTION, SOLUTION INTRAVENOUS ONCE
Status: DISCONTINUED | OUTPATIENT
Start: 2024-08-16 | End: 2024-08-17 | Stop reason: HOSPADM

## 2024-08-16 RX ORDER — LIDOCAINE HYDROCHLORIDE 20 MG/ML
INJECTION INTRAVENOUS
Status: DISCONTINUED | OUTPATIENT
Start: 2024-08-16 | End: 2024-08-16

## 2024-08-16 RX ORDER — HYDROMORPHONE HYDROCHLORIDE 2 MG/ML
0.2 INJECTION, SOLUTION INTRAMUSCULAR; INTRAVENOUS; SUBCUTANEOUS EVERY 5 MIN PRN
Status: DISCONTINUED | OUTPATIENT
Start: 2024-08-16 | End: 2024-08-17 | Stop reason: HOSPADM

## 2024-08-16 RX ORDER — FENTANYL CITRATE 50 UG/ML
INJECTION, SOLUTION INTRAMUSCULAR; INTRAVENOUS
Status: DISCONTINUED | OUTPATIENT
Start: 2024-08-16 | End: 2024-08-16

## 2024-08-16 RX ORDER — METOCLOPRAMIDE HYDROCHLORIDE 5 MG/ML
10 INJECTION INTRAMUSCULAR; INTRAVENOUS EVERY 10 MIN PRN
Status: DISCONTINUED | OUTPATIENT
Start: 2024-08-16 | End: 2024-08-17 | Stop reason: HOSPADM

## 2024-08-16 RX ORDER — HYDROXYZINE HYDROCHLORIDE 25 MG/1
25 TABLET, FILM COATED ORAL 3 TIMES DAILY PRN
Qty: 30 TABLET | Refills: 0 | Status: SHIPPED | OUTPATIENT
Start: 2024-08-16 | End: 2024-09-15

## 2024-08-16 RX ORDER — MEPERIDINE HYDROCHLORIDE 25 MG/ML
12.5 INJECTION INTRAMUSCULAR; INTRAVENOUS; SUBCUTANEOUS ONCE
Status: ACTIVE | OUTPATIENT
Start: 2024-08-16 | End: 2024-08-17

## 2024-08-16 RX ADMIN — LEVETIRACETAM 500 MG: 500 TABLET, FILM COATED ORAL at 10:08

## 2024-08-16 RX ADMIN — METHADONE HYDROCHLORIDE 10 MG: 10 TABLET ORAL at 05:08

## 2024-08-16 RX ADMIN — PANTOPRAZOLE SODIUM 20 MG: 20 TABLET, DELAYED RELEASE ORAL at 10:08

## 2024-08-16 RX ADMIN — CYCLOBENZAPRINE 10 MG: 10 TABLET, FILM COATED ORAL at 10:08

## 2024-08-16 RX ADMIN — PRAVASTATIN SODIUM 10 MG: 10 TABLET ORAL at 10:08

## 2024-08-16 RX ADMIN — DOCUSATE SODIUM 100 MG: 100 CAPSULE, LIQUID FILLED ORAL at 09:08

## 2024-08-16 RX ADMIN — PANTOPRAZOLE SODIUM 20 MG: 20 TABLET, DELAYED RELEASE ORAL at 09:08

## 2024-08-16 RX ADMIN — HYDROCODONE BITARTRATE AND ACETAMINOPHEN 1 TABLET: 5; 325 TABLET ORAL at 07:08

## 2024-08-16 RX ADMIN — PROPRANOLOL HYDROCHLORIDE 10 MG: 10 TABLET ORAL at 10:08

## 2024-08-16 RX ADMIN — LEVETIRACETAM 500 MG: 500 TABLET, FILM COATED ORAL at 09:08

## 2024-08-16 RX ADMIN — GABAPENTIN 800 MG: 400 CAPSULE ORAL at 10:08

## 2024-08-16 RX ADMIN — SODIUM CHLORIDE, SODIUM GLUCONATE, SODIUM ACETATE, POTASSIUM CHLORIDE AND MAGNESIUM CHLORIDE: 526; 502; 368; 37; 30 INJECTION, SOLUTION INTRAVENOUS at 12:08

## 2024-08-16 RX ADMIN — PROPRANOLOL HYDROCHLORIDE 10 MG: 10 TABLET ORAL at 09:08

## 2024-08-16 RX ADMIN — FENTANYL CITRATE 100 MCG: 50 INJECTION, SOLUTION INTRAMUSCULAR; INTRAVENOUS at 12:08

## 2024-08-16 RX ADMIN — DOCUSATE SODIUM 100 MG: 100 CAPSULE, LIQUID FILLED ORAL at 10:08

## 2024-08-16 RX ADMIN — LIDOCAINE HYDROCHLORIDE 40 MG: 20 INJECTION INTRAVENOUS at 12:08

## 2024-08-16 RX ADMIN — TOPIRAMATE 50 MG: 25 TABLET, FILM COATED ORAL at 10:08

## 2024-08-16 RX ADMIN — THERA TABS 1 TABLET: TAB at 09:08

## 2024-08-16 RX ADMIN — BUSPIRONE HYDROCHLORIDE 30 MG: 15 TABLET ORAL at 09:08

## 2024-08-16 RX ADMIN — PROPOFOL 160 MG: 10 INJECTION, EMULSION INTRAVENOUS at 12:08

## 2024-08-16 RX ADMIN — SERTRALINE HYDROCHLORIDE 100 MG: 50 TABLET ORAL at 09:08

## 2024-08-16 RX ADMIN — SODIUM CHLORIDE, POTASSIUM CHLORIDE, SODIUM LACTATE AND CALCIUM CHLORIDE: 600; 310; 30; 20 INJECTION, SOLUTION INTRAVENOUS at 01:08

## 2024-08-16 RX ADMIN — SERTRALINE HYDROCHLORIDE 100 MG: 50 TABLET ORAL at 10:08

## 2024-08-16 RX ADMIN — LAMOTRIGINE 50 MG: 25 TABLET ORAL at 09:08

## 2024-08-16 RX ADMIN — METHADONE HYDROCHLORIDE 10 MG: 10 TABLET ORAL at 09:08

## 2024-08-16 RX ADMIN — METHADONE HYDROCHLORIDE 10 MG: 10 TABLET ORAL at 10:08

## 2024-08-16 RX ADMIN — MIDAZOLAM HYDROCHLORIDE 2 MG: 1 INJECTION, SOLUTION INTRAMUSCULAR; INTRAVENOUS at 11:08

## 2024-08-16 RX ADMIN — ENOXAPARIN SODIUM 40 MG: 40 INJECTION SUBCUTANEOUS at 05:08

## 2024-08-16 RX ADMIN — MIRTAZAPINE 15 MG: 15 TABLET, FILM COATED ORAL at 10:08

## 2024-08-16 RX ADMIN — GADOBENATE DIMEGLUMINE 20 ML: 529 INJECTION, SOLUTION INTRAVENOUS at 01:08

## 2024-08-16 RX ADMIN — CYCLOBENZAPRINE 10 MG: 10 TABLET, FILM COATED ORAL at 09:08

## 2024-08-16 RX ADMIN — GABAPENTIN 800 MG: 400 CAPSULE ORAL at 09:08

## 2024-08-16 RX ADMIN — VITAM B12 100 MCG: 100 TAB at 09:08

## 2024-08-16 NOTE — ANESTHESIA POSTPROCEDURE EVALUATION
Anesthesia Post Evaluation    Patient: Jordan Gandara    Procedure(s) Performed: Procedure(s) (LRB):  MRI (Magnetic Resonance Imagine) (N/A)    Final Anesthesia Type: general      Patient location during evaluation: PACU  Patient participation: Yes- Able to Participate  Level of consciousness: awake and alert  Post-procedure vital signs: reviewed and stable  Pain management: adequate  Airway patency: patent  BRAULIO mitigation strategies: Multimodal analgesia  PONV status at discharge: No PONV  Anesthetic complications: no      Cardiovascular status: hemodynamically stable  Respiratory status: unassisted  Hydration status: euvolemic  Follow-up not needed.              Vitals Value Taken Time   /85 08/16/24 1331   Temp 36.3 °C (97.3 °F) 08/16/24 1314   Pulse 79 08/16/24 1333   Resp 17 08/16/24 1332   SpO2 94 % 08/16/24 1333   Vitals shown include unfiled device data.      No case tracking events are documented in the log.      Pain/Susanna Score: Pain Rating Prior to Med Admin: 8 (8/16/2024  9:23 AM)  Pain Rating Post Med Admin: 6 (8/16/2024 10:24 AM)  Susanna Score: 8 (8/16/2024  1:10 PM)

## 2024-08-16 NOTE — PROGRESS NOTES
[facility]                       [address] [place] [address]    PATIENT NAME:       JASMIN THORNTON  YOB: 1970  CSN:                [id number]   MRN:                [id number]  ADMIT DATE:         2024 17:30:00  PHYSICIAN:          Glendy Hope MD                           TESTING    DATE OF SERVICE:  08/15/2024 00:00:00    STUDY PERFORMED:  EEG.    DESCRIPTION:  This electroencephalogram was done using 10/20 systems, using 21   channels.  The background activity is consistent of 8.5 hertz.  Patient was   predominantly drowsy during the study.  Stage 2 sleep was noted with sleep   spindles.  Photic stimulation elicited normal responses.    CONCLUSION:  This is a normal awake, drowsy, and sleep EEG.  No evidence of any   epileptiform discharges.  Clinical correlation suggested.        ______________________________  MD ZAKI Bedolla/AQS  DD:  08/15/2024  Time:  11:21PM  DT:  2024  Time:  12:53AM  Job #:  [id number]       TESTING

## 2024-08-16 NOTE — PROGRESS NOTES
[facility] - Observation Unit  Neurology  Progress Note    Patient Name: Jordan Gandara  MRN: [id number]  Admission Date: 8/14/2024  Hospital Length of Stay: 2 days  Code Status: Full Code   Attending Provider: Alexandro Chester MD  Primary Care Physician: Hanna Benitez MD   Principal Problem:Seizure-like activity    HPI:   53-year-old female with PMH GERD, IBS, hypothyroidism, BRAULIO, anxiety/depression, chronic back pain who presented to ED on 08/14 with seizure-like activity.  Patient describes episode as full body shaking, loss of consciousness, with associated tongue biting and urinary incontinence lasting aprox 1-2 mins, and was followed by postictal confusion per patient's 's report.  Patient's  at bedside reports several episodes over the last 2 days PTA.  Denies h/o seizures.  Reports recently had several tooth extractions with bone graft placement, was taking narcotics for pain medication, and amoxicillin following procedure.      CT head without revealed loss of gray-white matter differentiation to bilateral frontoparietal regions.  Labs significant for normocytic anemia, BUN/creatinine 23.1/1.62, , and otherwise unremarkable.    Overview/Hospital Course:  No notes on file        Subjective:     Interval History:   Neurologic exam remains unchanged.    EEG unrevealing for epileptiform discharges yesterday.  Results discussed with patient and patient's  at bedside.  AED management discussed with patient and patient's .    Current Neurological Medications:     Current Facility-Administered Medications   Medication Dose Route Frequency Provider Last Rate Last Admin    acetaminophen tablet 650 mg  650 mg Oral Q6H PRN Rachel Nunez, AGACNP-BC        albuterol-ipratropium 2.5 mg-0.5 mg/3 mL nebulizer solution 3 mL  3 mL Nebulization Once PRN Vinod Daugherty DO        aluminum-magnesium hydroxide-simethicone 200-200-20 mg/5 mL suspension 30 mL  30 mL  Oral QID PRN EnriqueRachel AGACNP-BC        busPIRone tablet 30 mg  30 mg Oral BID EnriqueRachel roe AGACNP-BC   30 mg at 08/16/24 0923    cyanocobalamin tablet 100 mcg  100 mcg Oral Daily Rachel Nunez AGACNP-BC   100 mcg at 08/16/24 0923    cyclobenzaprine tablet 10 mg  10 mg Oral TID EnriqueRachel AGACNP-BC   10 mg at 08/16/24 0923    dextrose 10% bolus 125 mL 125 mL  12.5 g Intravenous PRN Vinod Daugherty, DO        dextrose 10% bolus 250 mL 250 mL  25 g Intravenous PRN Vinod Daugherty,         diphenhydrAMINE injection 25 mg  25 mg Intravenous Q6H PRN Vinod Daugherty,         docusate sodium capsule 100 mg  100 mg Oral BID EnriqueRachel AGACNP-BC   100 mg at 08/16/24 0923    enoxaparin injection 40 mg  40 mg Subcutaneous Daily EnriqueRachel AGACNP-BC   40 mg at 08/15/24 1757    gabapentin capsule 800 mg  800 mg Oral TID EnriqueRachel AGACNP-BC   800 mg at 08/16/24 0924    glucagon (human recombinant) injection 1 mg  1 mg Intramuscular PRN Vinod Daugherty,         HYDROcodone-acetaminophen 5-325 mg per tablet 1 tablet  1 tablet Oral Q3H PRN Vinod Daugherty,         HYDROmorphone (PF) injection 0.2 mg  0.2 mg Intravenous Q5 Min PRVinod Watson,         HYDROmorphone (PF) injection 0.5 mg  0.5 mg Intravenous Q5 Min PRN Vinod Daugherty,         hydrOXYzine HCL tablet 25 mg  25 mg Oral TID PRN Deangelo Romo NP        lamoTRIgine tablet 50 mg  50 mg Oral Daily Ericka Garcia FNP   50 mg at 08/16/24 0926    levETIRAcetam tablet 500 mg  500 mg Oral BID Ericka Garcia FNP   500 mg at 08/16/24 0931    levothyroxine tablet 25 mcg  25 mcg Oral Before breakfast Rachel Nunez LEIGH ANN AGACNP-BC   25 mcg at 08/15/24 0634    LORazepam tablet 1 mg  1 mg Oral Q6H PRN Rachel Nunez AGACNP-BC        melatonin tablet 6 mg  6 mg Oral Nightly PRN Rachel Nunez AGACNP-BC        meperidine (PF) injection 12.5 mg  12.5 mg Intravenous Once Vinod Daugherty DO        methadone tablet 10 mg  10 mg  Oral QID Alexandro Chester MD   10 mg at 08/16/24 0924    metoclopramide injection 10 mg  10 mg Intravenous Q10 Min PRN Vinod Daugherty,         mirtazapine tablet 15 mg  15 mg Oral QHS Deangelo Romo NP   15 mg at 08/15/24 2105    multivitamin tablet  1 tablet Oral Daily Rachel Nunez AGACNP-BC   1 tablet at 08/16/24 0926    naloxone 0.4 mg/mL injection 0.02 mg  0.02 mg Intravenous PRN Rachel Nunez AGACNP-BC        ondansetron injection 4 mg  4 mg Intravenous Q4H PRN Rachel Nunez AGACNP-BC        ondansetron injection 4 mg  4 mg Intravenous Once Vinod Daugherty,         pantoprazole EC tablet 20 mg  20 mg Oral BID Rachel Nunez AGACNP-BC   20 mg at 08/16/24 0926    polyethylene glycol packet 17 g  17 g Oral BID PRN Rachel Nunez AGACNP-BC        pravastatin tablet 10 mg  10 mg Oral QHS Rachel Nunez AGACNP-BC   10 mg at 08/15/24 2107    prochlorperazine injection Soln 5 mg  5 mg Intravenous Q6H PRN Rachel Nunez AGACNP-BC        propranoloL tablet 10 mg  10 mg Oral BID Rachel Nunez AGACNP-BC   10 mg at 08/16/24 0941    sertraline tablet 100 mg  100 mg Oral BID Rachel Nunez AGACNP-BC   100 mg at 08/16/24 0926    simethicone chewable tablet 80 mg  1 tablet Oral QID PRN Rachel Nunez AGACNP-BC        topiramate tablet 50 mg  50 mg Oral QHS Rachel Nunez AGACNP-BC   50 mg at 08/15/24 2108     Facility-Administered Medications Ordered in Other Encounters   Medication Dose Route Frequency Provider Last Rate Last Admin    electrolyte-A infusion   Intravenous Continuous PRN Rocco Sullivan, CRNA 75 mL/hr at 08/16/24 1208 New Bag at 08/16/24 1208    fentaNYL injection   Intravenous PRN Rocco Sullivan, CRNA   100 mcg at 08/16/24 1208    LIDOcaine (cardiac) injection   Intravenous PRN Rocco Sullivan, CRNA   40 mg at 08/16/24 1208    midazolam injection   Intravenous PRN Rocco Sullivan, CRNA   2 mg at 08/16/24 1159    propofol (DIPRIVAN) 10 mg/mL infusion    Intravenous PRN Rocco Sullivan, CRNA   160 mg at 08/16/24 1208       Review of Systems  A 14pt ros was reviewed & is negative unless o/w documented in the hpi    Objective:     Vital Signs (Most Recent):  Temp: 97.5 °F (36.4 °C) (08/16/24 1129)  Pulse: 70 (08/16/24 1129)  Resp: 12 (08/16/24 0923)  BP: 124/78 (08/16/24 1129)  SpO2: 96 % (08/16/24 1129) Vital Signs (24h Range):  Temp:  [97.5 °F (36.4 °C)-98.6 °F (37 °C)] 97.5 °F (36.4 °C)  Pulse:  [68-81] 70  Resp:  [12-18] 12  SpO2:  [89 %-98 %] 96 %  BP: (102-136)/(64-83) 124/78     Weight: 111.1 kg (244 lb 14.9 oz)  Body mass index is 42.04 kg/m².     Physical Exam   GENERAL: NAD, calm, cooperative, appropriate, awake/alert  MENTAL STATUS: Oriented x4, follows commands reliably  SPEECH/LANGUAGE: Clear, coherent  CN:  EOMI gaze conjugate, visual fields intact  PERRLA  Motor: no focal motor weakness  Sensory: Normal to tactile stim/vibration  Gait: not observed         Significant Labs:   Recent Lab Results         08/16/24  0607        Anion Gap 9.0       Baso # 0.05       Basophil % 1.0       BUN 12.7       BUN/CREAT RATIO 15       Calcium 8.8       Chloride 110       CO2 23       Creatinine 0.83       eGFR >60       Eos # 0.09       Eos % 1.8       Glucose 83       Hematocrit 30.8       Hemoglobin 9.9       Immature Grans (Abs) 0.03       Immature Granulocytes 0.6       Lymph # 1.52       LYMPH % 30.1       MCH 28.0       MCHC 32.1       MCV 87.0       Mono # 0.61       Mono % 12.1       MPV 9.7       Neut # 2.75       Neut % 54.4       nRBC 0.0       Platelet Count 154       Potassium 3.6       RBC 3.54       RDW 14.1       Sodium 142       WBC 5.05               Significant Imaging: I have reviewed all pertinent imaging results/findings within the past 24 hours.  Assessment and Plan:     * Seizure-like activity  MRI brain w w/o with anesthesia pending  Seizure precautions  Started on Lamictal 50 mg daily x1 day, will increase to b.i.d. tomorrow  Continue  Keppra 500 mg b.i.d. today, we will discontinue tomorrow when Lamictal doses increased  Give Ativan 2 mg IV for witnessed clinical seizures  Extended EEG ordered to rule out epileptic versus nonepileptic seizures  Further recommendations per MD            VTE Risk Mitigation (From admission, onward)           Ordered     enoxaparin injection 40 mg  Daily         08/15/24 0012     IP VTE HIGH RISK PATIENT  Once         08/15/24 0003     Place sequential compression device  Until discontinued         08/15/24 0003                    [facility]  Inpatient Neurology  [facility]

## 2024-08-16 NOTE — SUBJECTIVE & OBJECTIVE
Subjective:     Interval History:   Neurologic exam remains unchanged.    EEG unrevealing for epileptiform discharges yesterday.  Results discussed with patient and patient's  at bedside.  AED management discussed with patient and patient's .    Current Neurological Medications:     Current Facility-Administered Medications   Medication Dose Route Frequency Provider Last Rate Last Admin    acetaminophen tablet 650 mg  650 mg Oral Q6H PRN Rachel Nunez AGACNP-BC        albuterol-ipratropium 2.5 mg-0.5 mg/3 mL nebulizer solution 3 mL  3 mL Nebulization Once PRN Vinod Daugherty,         aluminum-magnesium hydroxide-simethicone 200-200-20 mg/5 mL suspension 30 mL  30 mL Oral QID PRN Rachel Nunez AGACNP-BC        busPIRone tablet 30 mg  30 mg Oral BID Rachel Nunez AGACNP-BC   30 mg at 08/16/24 0923    cyanocobalamin tablet 100 mcg  100 mcg Oral Daily Rachel Nunez AGACNP-BC   100 mcg at 08/16/24 0923    cyclobenzaprine tablet 10 mg  10 mg Oral TID Rachel Nunez AGACNP-BC   10 mg at 08/16/24 0923    dextrose 10% bolus 125 mL 125 mL  12.5 g Intravenous PRN Vinod Daugherty,         dextrose 10% bolus 250 mL 250 mL  25 g Intravenous PRN Vinod Daugherty,         diphenhydrAMINE injection 25 mg  25 mg Intravenous Q6H PRN Vinod Daugherty,         docusate sodium capsule 100 mg  100 mg Oral BID Rachel Nunez AGACNP-BC   100 mg at 08/16/24 0923    enoxaparin injection 40 mg  40 mg Subcutaneous Daily Rachel Nunez AGACNP-BC   40 mg at 08/15/24 1757    gabapentin capsule 800 mg  800 mg Oral TID Rachel Nunez AGACNP-BC   800 mg at 08/16/24 0924    glucagon (human recombinant) injection 1 mg  1 mg Intramuscular PRN Vinod Daugherty,         HYDROcodone-acetaminophen 5-325 mg per tablet 1 tablet  1 tablet Oral Q3H PRN Vinod Daugherty,         HYDROmorphone (PF) injection 0.2 mg  0.2 mg Intravenous Q5 Min PRN Vinod Daugherty DO        HYDROmorphone (PF) injection 0.5 mg  0.5 mg  Intravenous Q5 Min PRN Vinod Daugherty,         hydrOXYzine HCL tablet 25 mg  25 mg Oral TID PRN Deangelo Romo, MARA        lamoTRIgine tablet 50 mg  50 mg Oral Daily Ericka Garcia FNP   50 mg at 08/16/24 0926    levETIRAcetam tablet 500 mg  500 mg Oral BID Jose Ericka FNP   500 mg at 08/16/24 0931    levothyroxine tablet 25 mcg  25 mcg Oral Before breakfast Rachel Nunez AGACNP-BC   25 mcg at 08/15/24 0634    LORazepam tablet 1 mg  1 mg Oral Q6H PRN Rachel Nunez AGACNP-BC        melatonin tablet 6 mg  6 mg Oral Nightly PRN Rachel Nunez AGACNP-BC        meperidine (PF) injection 12.5 mg  12.5 mg Intravenous Once Vinod Daugherty,         methadone tablet 10 mg  10 mg Oral QID Alexandro Chester MD   10 mg at 08/16/24 0924    metoclopramide injection 10 mg  10 mg Intravenous Q10 Min PRN Vinod Daugherty,         mirtazapine tablet 15 mg  15 mg Oral QHS Deangelo Romo NP   15 mg at 08/15/24 2105    multivitamin tablet  1 tablet Oral Daily Rachel Nunez AGACNP-BC   1 tablet at 08/16/24 0926    naloxone 0.4 mg/mL injection 0.02 mg  0.02 mg Intravenous PRN Rachel Nunez AGACNP-BC        ondansetron injection 4 mg  4 mg Intravenous Q4H PRN Rachel Nunez AGACNP-BC        ondansetron injection 4 mg  4 mg Intravenous Once Vinod Daugherty,         pantoprazole EC tablet 20 mg  20 mg Oral BID Rachel Nunez AGACNP-BC   20 mg at 08/16/24 0926    polyethylene glycol packet 17 g  17 g Oral BID PRN Rachel Nunez AGACNP-BC        pravastatin tablet 10 mg  10 mg Oral QHS Rachel Nunez AGACNP-BC   10 mg at 08/15/24 2107    prochlorperazine injection Soln 5 mg  5 mg Intravenous Q6H PRN Rachel Nunez AGACNP-BC        propranoloL tablet 10 mg  10 mg Oral BID Rachel Nunez AGACNP-BC   10 mg at 08/16/24 0941    sertraline tablet 100 mg  100 mg Oral BID Rachel Nunez AGACNP-BC   100 mg at 08/16/24 0926    simethicone chewable tablet 80 mg  1 tablet Oral QID PRN Rachel Nunez  LEIGH ANN, AGACNP-BC        topiramate tablet 50 mg  50 mg Oral QHS Rachel Nunez, AGACNP-BC   50 mg at 08/15/24 2108     Facility-Administered Medications Ordered in Other Encounters   Medication Dose Route Frequency Provider Last Rate Last Admin    electrolyte-A infusion   Intravenous Continuous PRN Laurie, Rocco C, CRNA 75 mL/hr at 08/16/24 1208 New Bag at 08/16/24 1208    fentaNYL injection   Intravenous PRN Laurie, Rocco C, CRNA   100 mcg at 08/16/24 1208    LIDOcaine (cardiac) injection   Intravenous PRN Laurie, Rocco C, CRNA   40 mg at 08/16/24 1208    midazolam injection   Intravenous PRN Laurie, Rocco C, CRNA   2 mg at 08/16/24 1159    propofol (DIPRIVAN) 10 mg/mL infusion   Intravenous PRN Laurie, Rocco C, CRNA   160 mg at 08/16/24 1208       Review of Systems  A 14pt ros was reviewed & is negative unless o/w documented in the hpi    Objective:     Vital Signs (Most Recent):  Temp: 97.5 °F (36.4 °C) (08/16/24 1129)  Pulse: 70 (08/16/24 1129)  Resp: 12 (08/16/24 0923)  BP: 124/78 (08/16/24 1129)  SpO2: 96 % (08/16/24 1129) Vital Signs (24h Range):  Temp:  [97.5 °F (36.4 °C)-98.6 °F (37 °C)] 97.5 °F (36.4 °C)  Pulse:  [68-81] 70  Resp:  [12-18] 12  SpO2:  [89 %-98 %] 96 %  BP: (102-136)/(64-83) 124/78     Weight: 111.1 kg (244 lb 14.9 oz)  Body mass index is 42.04 kg/m².     Physical Exam   GENERAL: NAD, calm, cooperative, appropriate, awake/alert  MENTAL STATUS: Oriented x4, follows commands reliably  SPEECH/LANGUAGE: Clear, coherent  CN:  EOMI gaze conjugate, visual fields intact  PERRLA  Motor: no focal motor weakness  Sensory: Normal to tactile stim/vibration  Gait: not observed         Significant Labs:   Recent Lab Results         08/16/24  0607        Anion Gap 9.0       Baso # 0.05       Basophil % 1.0       BUN 12.7       BUN/CREAT RATIO 15       Calcium 8.8       Chloride 110       CO2 23       Creatinine 0.83       eGFR >60       Eos # 0.09       Eos % 1.8       Glucose 83       Hematocrit  30.8       Hemoglobin 9.9       Immature Grans (Abs) 0.03       Immature Granulocytes 0.6       Lymph # 1.52       LYMPH % 30.1       MCH 28.0       MCHC 32.1       MCV 87.0       Mono # 0.61       Mono % 12.1       MPV 9.7       Neut # 2.75       Neut % 54.4       nRBC 0.0       Platelet Count 154       Potassium 3.6       RBC 3.54       RDW 14.1       Sodium 142       WBC 5.05               Significant Imaging: I have reviewed all pertinent imaging results/findings within the past 24 hours.

## 2024-08-16 NOTE — TRANSFER OF CARE
"Anesthesia Transfer of Care Note    Patient: Jordan Gandara    Procedure(s) Performed: Procedure(s) (LRB):  MRI (Magnetic Resonance Imagine) (N/A)    Patient location: PACU    Anesthesia Type: general    Transport from OR: Transported from OR on room air with adequate spontaneous ventilation    Post pain: adequate analgesia    Post assessment: no apparent anesthetic complications and tolerated procedure well    Post vital signs: stable    Level of consciousness: awake    Nausea/Vomiting: no nausea/vomiting    Complications: none    Transfer of care protocol was followed      Last vitals: Visit Vitals  BP (!) 147/78   Pulse 74   Temp 36.3 °C (97.3 °F)   Resp 16   Ht 5' 4" (1.626 m)   Wt 111.1 kg (244 lb 14.9 oz)   LMP 08/14/2024   SpO2 95%   Breastfeeding No   BMI 42.04 kg/m²     "

## 2024-08-16 NOTE — PROGRESS NOTES
8/16/2024  Jordan Gandara   1970   [id number]        Psychiatry Progress Note       SUBJECTIVE:   Jordan Gandara is a 53 y.o. female with a past medical history that includes GERD, IBS, hypothyroidism, BRAULIO, anxiety/depression, and chronic low back pain who presented to ED with seizure like activity that started occurring the day prior to arrival. Stated it involved all four extremities, that she was awake and alert during these. Reported they last a minute or two and that she has urinated on her self and bitten her tongue when it happened. Denies a history of seizures. CT head without revealed loss of gray-white matter differentiation to bilateral frontoparietal regions.  Psychiatry consulted for possible panic attacks.     Seen at the bedside where she appears calm and less anxious compared to yesterday. Reports several seizure-like attacks overnight, but had denies having any this morning. Remeron started yesterday and reports no issue falling asleep, but does report frequent awakening due to environmental factors. MRI brain today with no acute intracranial abnormality. Neurology to conduct an extended EEG. Lamictal started by neurology for seizures as well as mood. No evidence of psychosis. Denies suicidal ideations, homicidal ideations, or hallucinations.      Current Medications:   Scheduled Meds:    busPIRone  30 mg Oral BID    cyanocobalamin  100 mcg Oral Daily    cyclobenzaprine  10 mg Oral TID    docusate sodium  100 mg Oral BID    enoxparin  40 mg Subcutaneous Daily    gabapentin  800 mg Oral TID    lamoTRIgine  50 mg Oral Daily    levETIRAcetam  500 mg Oral BID    levothyroxine  25 mcg Oral Before breakfast    methadone  10 mg Oral QID    mirtazapine  15 mg Oral QHS    multivitamin  1 tablet Oral Daily    pantoprazole  20 mg Oral BID    pravastatin  10 mg Oral QHS    propranoloL  10 mg Oral BID    sertraline  100 mg Oral BID    topiramate  50 mg Oral QHS      PRN Meds:   Current  Facility-Administered Medications:     acetaminophen, 650 mg, Oral, Q6H PRN    aluminum-magnesium hydroxide-simethicone, 30 mL, Oral, QID PRN    dextrose 10%, 12.5 g, Intravenous, PRN    dextrose 10%, 25 g, Intravenous, PRN    hydrOXYzine HCL, 25 mg, Oral, TID PRN    LORazepam, 1 mg, Oral, Q6H PRN    melatonin, 6 mg, Oral, Nightly PRN    naloxone, 0.02 mg, Intravenous, PRN    ondansetron, 4 mg, Intravenous, Q4H PRN    polyethylene glycol, 17 g, Oral, BID PRN    prochlorperazine, 5 mg, Intravenous, Q6H PRN    simethicone, 1 tablet, Oral, QID PRN   Psychotherapeutics (From admission, onward)      Start     Stop Route Frequency Ordered    08/15/24 2100  mirtazapine tablet 15 mg         -- Oral Nightly 08/15/24 1455    08/15/24 0950  LORazepam tablet 1 mg         -- Oral Every 6 hours PRN 08/15/24 0850    08/15/24 0115  busPIRone tablet 30 mg         -- Oral 2 times daily 08/15/24 0106    08/15/24 0115  sertraline tablet 100 mg         -- Oral 2 times daily 08/15/24 0106            Allergies:   Review of patient's allergies indicates:   Allergen Reactions    Diclofenac      Other reaction(s): nausea, vomiting    Nsaids (non-steroidal anti-inflammatory drug)     Toradol [ketorolac] Nausea Only     severe        OBJECTIVE:   Vitals   Vitals:    08/16/24 0941   BP: 136/83   Pulse: 73   Resp:    Temp:         Labs/Imaging/Studies:   Recent Results (from the past 36 hour(s))   Comprehensive Metabolic Panel (CMP)    Collection Time: 08/15/24  6:07 AM   Result Value Ref Range    Sodium 143 136 - 145 mmol/L    Potassium 3.5 3.5 - 5.1 mmol/L    Chloride 110 (H) 98 - 107 mmol/L    CO2 20 (L) 22 - 29 mmol/L    Glucose 101 (H) 74 - 100 mg/dL    Blood Urea Nitrogen 23.1 (H) 9.8 - 20.1 mg/dL    Creatinine 1.62 (H) 0.55 - 1.02 mg/dL    Calcium 9.0 8.4 - 10.2 mg/dL    Protein Total 6.3 (L) 6.4 - 8.3 gm/dL    Albumin 3.6 3.5 - 5.0 g/dL    Globulin 2.7 2.4 - 3.5 gm/dL    Albumin/Globulin Ratio 1.3 1.1 - 2.0 ratio    Bilirubin Total 0.4  <=1.5 mg/dL     40 - 150 unit/L    ALT 17 0 - 55 unit/L    AST 23 5 - 34 unit/L    eGFR 38 mL/min/1.73/m2    Anion Gap 13.0 mEq/L    BUN/Creatinine Ratio 14    Magnesium    Collection Time: 08/15/24  6:07 AM   Result Value Ref Range    Magnesium Level 2.20 1.60 - 2.60 mg/dL   Phosphorus    Collection Time: 08/15/24  6:07 AM   Result Value Ref Range    Phosphorus Level 5.0 (H) 2.3 - 4.7 mg/dL   CBC with Differential    Collection Time: 08/15/24  6:07 AM   Result Value Ref Range    WBC 6.18 4.50 - 11.50 x10(3)/mcL    RBC 3.56 (L) 4.20 - 5.40 x10(6)/mcL    Hgb 9.8 (L) 12.0 - 16.0 g/dL    Hct 30.7 (L) 37.0 - 47.0 %    MCV 86.2 80.0 - 94.0 fL    MCH 27.5 27.0 - 31.0 pg    MCHC 31.9 (L) 33.0 - 36.0 g/dL    RDW 14.1 11.5 - 17.0 %    Platelet 157 130 - 400 x10(3)/mcL    MPV 9.5 7.4 - 10.4 fL    Neut % 68.7 %    Lymph % 17.0 %    Mono % 11.3 %    Eos % 1.9 %    Basophil % 0.6 %    Lymph # 1.05 0.6 - 4.6 x10(3)/mcL    Neut # 4.24 2.1 - 9.2 x10(3)/mcL    Mono # 0.70 0.1 - 1.3 x10(3)/mcL    Eos # 0.12 0 - 0.9 x10(3)/mcL    Baso # 0.04 <=0.2 x10(3)/mcL    IG# 0.03 0 - 0.04 x10(3)/mcL    IG% 0.5 %    NRBC% 0.0 %    IPF 1.9 0.9 - 11.2 %   Basic Metabolic Panel    Collection Time: 08/16/24  6:07 AM   Result Value Ref Range    Sodium 142 136 - 145 mmol/L    Potassium 3.6 3.5 - 5.1 mmol/L    Chloride 110 (H) 98 - 107 mmol/L    CO2 23 22 - 29 mmol/L    Glucose 83 74 - 100 mg/dL    Blood Urea Nitrogen 12.7 9.8 - 20.1 mg/dL    Creatinine 0.83 0.55 - 1.02 mg/dL    BUN/Creatinine Ratio 15     Calcium 8.8 8.4 - 10.2 mg/dL    Anion Gap 9.0 mEq/L    eGFR >60 mL/min/1.73/m2   CBC with Differential    Collection Time: 08/16/24  6:07 AM   Result Value Ref Range    WBC 5.05 4.50 - 11.50 x10(3)/mcL    RBC 3.54 (L) 4.20 - 5.40 x10(6)/mcL    Hgb 9.9 (L) 12.0 - 16.0 g/dL    Hct 30.8 (L) 37.0 - 47.0 %    MCV 87.0 80.0 - 94.0 fL    MCH 28.0 27.0 - 31.0 pg    MCHC 32.1 (L) 33.0 - 36.0 g/dL    RDW 14.1 11.5 - 17.0 %    Platelet 154 130 - 400  x10(3)/mcL    MPV 9.7 7.4 - 10.4 fL    Neut % 54.4 %    Lymph % 30.1 %    Mono % 12.1 %    Eos % 1.8 %    Basophil % 1.0 %    Lymph # 1.52 0.6 - 4.6 x10(3)/mcL    Neut # 2.75 2.1 - 9.2 x10(3)/mcL    Mono # 0.61 0.1 - 1.3 x10(3)/mcL    Eos # 0.09 0 - 0.9 x10(3)/mcL    Baso # 0.05 <=0.2 x10(3)/mcL    IG# 0.03 0 - 0.04 x10(3)/mcL    IG% 0.6 %    NRBC% 0.0 %          Psychiatric Mental Status Exam:  General Appearance: appears stated age, dressed in hospital garb, lying in bed  Arousal: alert  Behavior: cooperative, polite, appropriate eye-contact  Movements and Motor Activity:  seizure like activity in all four extremities   Orientation: oriented to person, place, time, and situation  Speech: normal rate, rhythm, volume, tone and pitch  Mood: euthymic  Affect: mood-congruent, calm   Thought Process: intact, linear, goal-directed, organized, logical  Associations: intact, no loosening of associations  Thought Content and Perceptions: no suicidal or homicidal ideation, no auditory or visual hallucinations, no paranoid ideation, no ideas of reference, no evidence of delusions or psychosis  Recent and Remote Memory: grossly intact, able to recall relevant and salient information from the recent and remote past; per interview/observation with patient  Attention and Concentration: grossly intact; per interview/observation with patient  Fund of Knowledge: grossly intact; based on history, vocabulary, fund of knowledge, syntax, grammar, and content  Insight: adequate; based on understanding of severity of illness and HPI  Judgment: adequate; based on patient's behavior and HPI    ASSESSMENT/PLAN:   Problems Addressed/Diagnoses:  Anxiety disorder NOS rule-out panic attacks vs functional neurological (conversion) disorder  History of major depressive disorder  History of panic attacks       Past Medical History:   Diagnosis Date    Anxiety     Chronic lower back pain     Depression     GERD (gastroesophageal reflux disease)      IBS (irritable bowel syndrome)     Left knee pain, unspecified chronicity     Neuropathy     Obesity     Personal history of fall     Sleep apnea     Will need Bi Pap does not have yet    Swelling of left knee joint     Thyroid disease     Tremors of nervous system         Plan:  Medication management  Buspirone 30mg PO BID  Sertraline 100mg PO BID  Remeron 15mg PO QHS  Hydroxyzine 25mg PO TID PRN anxiety  PEC not recommended at this time. Not currently an imminent risk of harm to self or others.  Would benefit from outpatient psychotherapy.  Follow-up with current psychiatric provider  Will sign-off; please reconsult as needed.        Deangelo Romo

## 2024-08-16 NOTE — PROGRESS NOTES
[facility] Medicine Progress Note        Chief Complaint: Inpatient Follow-up for seizures vs panic attacks     HPI:   Ms. Gandara is a 53 year old female with a pmh of GERD, IBS, hypothyroidism, BRAULIO, anxiety/depression, chronic low back pain who presented to the ED with seizure like activity.  She states that it started occurring yesterday. She states that it involves all 4 extremities, that she is awake and alert during the process. She states that these seizures only last a minute or two and has urinated on herself and bitten her tongue when it happens. Denies any post-ictal symptoms.     ED vitals on arrival:  Temp 98.3° F, pulse 98, resp 22, /65, SpO2 94% on RA.  Today's ED lab work revealed H&H 11/33.9, CO2 21, BUN/CR 24.1/2.37, , UA positive for opiates.  CT head without contrast showed some loss of gray-white differentiation in the frontal and parietal regions.  MRI correlation is recommended.  She was given Keppra, droperidol, Valium, and fluids in the ED.  She was admitted to Hospital Medicine for management.    Patient was continued with Keppra. Her symptoms seemed more like pseudoseizures vs panic attacks. Psych team was consulted and adjusted meds. She had no new episodes. Since she had an abnormal CT brain, a MRI was ordered.     She was other stable and at baseline. She is on home scheduled methadone. Advised to wean as much as possible. She will be discharged home if MRI brain comes back negative for acute changes.        Interval Hx:   Patient awake and comfortable. Denies any new episodes. Slept well last night. Looks more relaxed and calm today. Has been afebrile.     Family at bedside, explained in detail about the patients condition, diagnosis, vitals, labs and treatment plan. They understand and agree with the plan. All their questions were answered.      At home she walks with a walker or uses a wheel chair     Case was discussed with patient's  nurse and  on the floor.    Objective/physical exam:  General: In no acute distress, obese, calm mood   Chest: Clear to auscultation bilaterally  Heart: RRR, +S1, S2, no appreciable murmur  Abdomen: Soft, nontender, BS +  Neurologic: Alert and oriented x4, Cranial nerve II-XII intact, Strength 5/5 in all 4 extremities      VITAL SIGNS: 24 HRS MIN & MAX LAST   Temp  Min: 98.2 °F (36.8 °C)  Max: 98.6 °F (37 °C) 98.3 °F (36.8 °C)   BP  Min: 102/66  Max: 136/83 136/83   Pulse  Min: 68  Max: 81  73   Resp  Min: 12  Max: 18 12   SpO2  Min: 89 %  Max: 98 % 97 %     I have reviewed the following labs:  Recent Labs   Lab 08/14/24  1828 08/15/24  0607 08/16/24  0607   WBC 10.92 6.18 5.05   RBC 4.05* 3.56* 3.54*   HGB 11.0* 9.8* 9.9*   HCT 33.9* 30.7* 30.8*   MCV 83.7 86.2 87.0   MCH 27.2 27.5 28.0   MCHC 32.4* 31.9* 32.1*   RDW 13.9 14.1 14.1    157 154   MPV 9.5 9.5 9.7     Recent Labs   Lab 08/14/24  1828 08/15/24  0607 08/16/24  0607    143 142   K 3.8 3.5 3.6    110* 110*   CO2 21* 20* 23   BUN 24.1* 23.1* 12.7   CREATININE 2.37* 1.62* 0.83   CALCIUM 9.2 9.0 8.8   MG  --  2.20  --    ALBUMIN 4.0 3.6  --    ALKPHOS 163* 150  --    ALT 21 17  --    AST 28 23  --    BILITOT 0.4 0.4  --      Microbiology Results (last 7 days)       Procedure Component Value Units Date/Time    Urine culture [[id number]] Collected: 08/14/24 1757    Order Status: Completed Specimen: Urine Updated: 08/16/24 0856     Urine Culture No Growth             See below for Radiology    Assessment/Plan:  Panic attack vs seizures   H/O anxiety and depression   ARF  Obesity   Chronic methadone use     History:  GERD, IBS, hypothyroidism, BRAULIO, anxiety/depression, chronic low back pain     Plan:  Patient looks very comfortable today  Mood is calm. No new seizure like episodes last night or this morning   She slept well     She is on chronic narcotics for back pain    S/B psych team and meds adjusted     Seen by neuro team and  continued on keppra    MRI under anesthesia today    Continue strict aspiration, fall and decubitus precautions      Renal functions improved, Continue iv fluids     BUN 12, Crt 0.83    Continue supportive care     VTE prophylaxis: Lovenox     Patient condition:  Fair    Anticipated discharge and Disposition:   Home if MRI brain is negative and cleared by neuro team       All diagnosis and differential diagnosis have been reviewed; assessment and plan has been documented; I have personally reviewed the labs and test results that are presently available; I have reviewed the patients medication list; I have reviewed the consulting providers response and recommendations. I have reviewed or attempted to review medical records based upon their availability    All of the patient's questions have been  addressed and answered. Patient's is agreeable to the above stated plan. I will continue to monitor closely and make adjustments to medical management as needed.    Portions of this note dictated using EMR integrated voice recognition software, and may be subject to voice recognition errors not corrected at proofreading. Please contact writer for clarification if needed.   _____________________________________________________________________    Malnutrition Status:    Scheduled Med:   busPIRone  30 mg Oral BID    cyanocobalamin  100 mcg Oral Daily    cyclobenzaprine  10 mg Oral TID    docusate sodium  100 mg Oral BID    enoxparin  40 mg Subcutaneous Daily    gabapentin  800 mg Oral TID    lamoTRIgine  50 mg Oral Daily    levETIRAcetam  500 mg Oral BID    levothyroxine  25 mcg Oral Before breakfast    methadone  10 mg Oral QID    mirtazapine  15 mg Oral QHS    multivitamin  1 tablet Oral Daily    pantoprazole  20 mg Oral BID    pravastatin  10 mg Oral QHS    propranoloL  10 mg Oral BID    sertraline  100 mg Oral BID    topiramate  50 mg Oral QHS      Continuous Infusions:       PRN Meds:    Current Facility-Administered  Medications:     acetaminophen, 650 mg, Oral, Q6H PRN    aluminum-magnesium hydroxide-simethicone, 30 mL, Oral, QID PRN    dextrose 10%, 12.5 g, Intravenous, PRN    dextrose 10%, 25 g, Intravenous, PRN    hydrOXYzine HCL, 25 mg, Oral, TID PRN    LORazepam, 1 mg, Oral, Q6H PRN    melatonin, 6 mg, Oral, Nightly PRN    naloxone, 0.02 mg, Intravenous, PRN    ondansetron, 4 mg, Intravenous, Q4H PRN    polyethylene glycol, 17 g, Oral, BID PRN    prochlorperazine, 5 mg, Intravenous, Q6H PRN    simethicone, 1 tablet, Oral, QID PRN     Radiology:  I have personally reviewed the following imaging and agree with the radiologist.     CT Head Without Contrast  Narrative: EXAMINATION:  CT HEAD WITHOUT CONTRAST    CLINICAL HISTORY:  Mental status change, unknown cause;    TECHNIQUE:  Multiple axial images were obtained from the base of the brain to the vertex without contrast administration.  Sagittal and coronal reconstructions were performed. .Automatic exposure control  (AEC) is utilized to reduce patient radiation exposure.    COMPARISON:  04/02/2017    FINDINGS:  There appears to be some loss of the gray-white differentiation in the frontal and parietal regions bilaterally.  MRI correlation is recommended.  No hemorrhage is seen.  No mass is seen.  Posterior fossa appears grossly unremarkable.    Calvarium is intact.  Paranasal sinuses appear grossly unremarkable.  Impression: Some loss of gray-white differentiation in the frontal and parietal regions.  MRI correlation is recommended.    This report was flagged in Epic as abnormal.    Electronically signed by: Gordo Allen  Date:    08/14/2024  Time:    18:02      Alexandro Chester MD  Department of Hospital Medicine   [facility]   08/16/2024

## 2024-08-16 NOTE — ASSESSMENT & PLAN NOTE
MRI brain w w/o with anesthesia pending  Seizure precautions  Started on Lamictal 50 mg daily x1 day, will increase to b.i.d. tomorrow  Continue Keppra 500 mg b.i.d. today, we will discontinue tomorrow when Lamictal doses increased  Give Ativan 2 mg IV for witnessed clinical seizures  Extended EEG ordered to rule out epileptic versus nonepileptic seizures  Further recommendations per MD

## 2024-08-16 NOTE — ANESTHESIA PREPROCEDURE EVALUATION
08/16/2024  Jordan Gandara is a 53 y.o., female w/ past medical history that includes GERD, IBS, hypothyroidism, BRAULIO, anxiety/depression, and chronic low back pain who presented to ED with seizure like activity that started occurring the day prior to arrival. Stated it involved all four extremities, that she was awake and alert during these. Reported they last a minute or two and that she has urinated on her self and bitten her tongue when it happened. Denies a history of seizures. CT head without revealed loss of gray-white matter differentiation to bilateral frontoparietal regions. She presents for MRI to further delineate Brain CT findings.      Pre-op Assessment    I have reviewed the Patient Summary Reports.     I have reviewed the Nursing Notes. I have reviewed the NPO Status.   I have reviewed the Medications.     Review of Systems  Anesthesia Hx:  No problems with previous Anesthesia                Social:  Non-Smoker       Hematology/Oncology:       -- Anemia:                                  Pulmonary:        Sleep Apnea                Hepatic/GI:     GERD             Neurological:       Seizures                                Endocrine:        Morbid Obesity / BMI > 40  Psych:  Psychiatric History anxiety depression                Physical Exam  General: Well nourished, Cooperative, Alert and Oriented    Airway:  Mallampati: III   Mouth Opening: Normal  TM Distance: Normal  Tongue: Normal  Neck ROM: Normal ROM    Dental:  Intact    Chest/Lungs:  Clear to auscultation, Normal Respiratory Rate    Heart:  Rate: Normal  Rhythm: Regular Rhythm  Sounds: Normal    Abdomen:  Normal, Soft, Nontender        Anesthesia Plan  Type of Anesthesia, risks & benefits discussed:    Anesthesia Type: Gen Supraglottic Airway  Intra-op Monitoring Plan: Standard ASA Monitors  Post Op Pain Control Plan: multimodal  analgesia  Induction:  IV  Airway Plan: Direct  Informed Consent: Informed consent signed with the Patient and all parties understand the risks and agree with anesthesia plan.  All questions answered.   ASA Score: 3  Day of Surgery Review of History & Physical: H&P Update referred to the surgeon/provider.    Ready For Surgery From Anesthesia Perspective.     .

## 2024-08-17 VITALS
HEART RATE: 66 BPM | OXYGEN SATURATION: 91 % | TEMPERATURE: 98 F | RESPIRATION RATE: 16 BRPM | HEIGHT: 64 IN | WEIGHT: 244.94 LBS | BODY MASS INDEX: 41.82 KG/M2 | SYSTOLIC BLOOD PRESSURE: 111 MMHG | DIASTOLIC BLOOD PRESSURE: 66 MMHG

## 2024-08-17 PROCEDURE — 95714 VEEG EA 12-26 HR UNMNTR: CPT

## 2024-08-17 PROCEDURE — 25000003 PHARM REV CODE 250: Performed by: INTERNAL MEDICINE

## 2024-08-17 PROCEDURE — 25000003 PHARM REV CODE 250

## 2024-08-17 PROCEDURE — 25000003 PHARM REV CODE 250: Performed by: NURSE PRACTITIONER

## 2024-08-17 PROCEDURE — 95700 EEG CONT REC W/VID EEG TECH: CPT

## 2024-08-17 RX ORDER — LAMOTRIGINE 25 MG/1
50 TABLET ORAL 2 TIMES DAILY
Qty: 120 TABLET | Refills: 11 | Status: SHIPPED | OUTPATIENT
Start: 2024-08-17 | End: 2025-08-17

## 2024-08-17 RX ADMIN — METHADONE HYDROCHLORIDE 10 MG: 10 TABLET ORAL at 09:08

## 2024-08-17 RX ADMIN — LEVOTHYROXINE SODIUM 25 MCG: 25 TABLET ORAL at 06:08

## 2024-08-17 RX ADMIN — DOCUSATE SODIUM 100 MG: 100 CAPSULE, LIQUID FILLED ORAL at 09:08

## 2024-08-17 RX ADMIN — CYCLOBENZAPRINE 10 MG: 10 TABLET, FILM COATED ORAL at 09:08

## 2024-08-17 RX ADMIN — PANTOPRAZOLE SODIUM 20 MG: 20 TABLET, DELAYED RELEASE ORAL at 09:08

## 2024-08-17 RX ADMIN — VITAM B12 100 MCG: 100 TAB at 09:08

## 2024-08-17 RX ADMIN — PROPRANOLOL HYDROCHLORIDE 10 MG: 10 TABLET ORAL at 09:08

## 2024-08-17 RX ADMIN — SERTRALINE HYDROCHLORIDE 100 MG: 50 TABLET ORAL at 09:08

## 2024-08-17 RX ADMIN — METHADONE HYDROCHLORIDE 10 MG: 10 TABLET ORAL at 02:08

## 2024-08-17 RX ADMIN — BUSPIRONE HYDROCHLORIDE 30 MG: 15 TABLET ORAL at 09:08

## 2024-08-17 RX ADMIN — THERA TABS 1 TABLET: TAB at 09:08

## 2024-08-17 RX ADMIN — LAMOTRIGINE 50 MG: 25 TABLET ORAL at 09:08

## 2024-08-17 RX ADMIN — GABAPENTIN 800 MG: 400 CAPSULE ORAL at 09:08

## 2024-08-17 NOTE — PROGRESS NOTES
[facility] Medicine Progress Note        Chief Complaint: Inpatient Follow-up for seizures vs panic attacks     HPI:   Ms. Gandara is a 53 year old female with a pmh of GERD, IBS, hypothyroidism, BRAULIO, anxiety/depression, chronic low back pain who presented to the ED with seizure like activity.  She states that it started occurring yesterday. She states that it involves all 4 extremities, that she is awake and alert during the process. She states that these seizures only last a minute or two and has urinated on herself and bitten her tongue when it happens. Denies any post-ictal symptoms.     ED vitals on arrival:  Temp 98.3° F, pulse 98, resp 22, /65, SpO2 94% on RA.  Today's ED lab work revealed H&H 11/33.9, CO2 21, BUN/CR 24.1/2.37, , UA positive for opiates.  CT head without contrast showed some loss of gray-white differentiation in the frontal and parietal regions.  MRI correlation is recommended.  She was given Keppra, droperidol, Valium, and fluids in the ED.  She was admitted to Hospital Medicine for management.    Patient was continued with Keppra. Her symptoms seemed more like pseudoseizures vs panic attacks. Psych team was consulted and adjusted meds. She had no new episodes. Since she had an abnormal CT brain, a MRI was ordered.     She was other stable and at baseline. She is on home scheduled methadone. Advised to wean as much as possible. She will be discharged home if MRI brain comes back negative for acute changes. MRI brain came back negative. EEG was unremarkable. Seen by neuro team and keppra changed to Lamictal 50 mg BID. She was stable and asymptomatic. Discharged to home today if cleared by neuro team.       Interval Hx:   Patient awake and comfortable. Had no new episodes. Has been afebrile. Eating well.     Family at bedside, explained in detail about the patients condition, diagnosis, vitals, labs and treatment plan. They understand and agree  with the plan. All their questions were answered.      At home she walks with a walker or uses a wheel chair     Case was discussed with patient's nurse and  on the floor.    Objective/physical exam:  General: In no acute distress, obese, calm mood   Chest: Clear to auscultation bilaterally  Heart: RRR, +S1, S2, no appreciable murmur  Abdomen: Soft, nontender, BS +  Neurologic: Alert and oriented x4, Cranial nerve II-XII intact, Strength 5/5 in all 4 extremities      VITAL SIGNS: 24 HRS MIN & MAX LAST   Temp  Min: 97.3 °F (36.3 °C)  Max: 98.9 °F (37.2 °C) 97.7 °F (36.5 °C)   BP  Min: 101/71  Max: 155/91 101/71   Pulse  Min: 60  Max: 81  60   Resp  Min: 12  Max: 20 20   SpO2  Min: 92 %  Max: 97 % (!) 92 %     I have reviewed the following labs:  Recent Labs   Lab 08/14/24  1828 08/15/24  0607 08/16/24  0607   WBC 10.92 6.18 5.05   RBC 4.05* 3.56* 3.54*   HGB 11.0* 9.8* 9.9*   HCT 33.9* 30.7* 30.8*   MCV 83.7 86.2 87.0   MCH 27.2 27.5 28.0   MCHC 32.4* 31.9* 32.1*   RDW 13.9 14.1 14.1    157 154   MPV 9.5 9.5 9.7     Recent Labs   Lab 08/14/24  1828 08/15/24  0607 08/16/24  0607    143 142   K 3.8 3.5 3.6    110* 110*   CO2 21* 20* 23   BUN 24.1* 23.1* 12.7   CREATININE 2.37* 1.62* 0.83   CALCIUM 9.2 9.0 8.8   MG  --  2.20  --    ALBUMIN 4.0 3.6  --    ALKPHOS 163* 150  --    ALT 21 17  --    AST 28 23  --    BILITOT 0.4 0.4  --      Microbiology Results (last 7 days)       Procedure Component Value Units Date/Time    Urine culture [[id number]] Collected: 08/14/24 1757    Order Status: Completed Specimen: Urine Updated: 08/16/24 0856     Urine Culture No Growth             See below for Radiology    Assessment/Plan:  Panic attack vs seizures   H/O anxiety and depression   ARF  Obesity   Chronic methadone use     History:  GERD, IBS, hypothyroidism, BRAULIO, anxiety/depression, chronic low back pain     Plan:  Patient had no acute issues last night or today  No new episodes   On lamictal 50  mg BID. Off keppra per neuro team     MRI brain did not show any acute changes   EEG negative     She is on chronic narcotics for back pain    S/B psych team and meds adjusted     Continue strict aspiration, fall and decubitus precautions        Continue supportive care     VTE prophylaxis: Lovenox     Patient condition:  Fair    Anticipated discharge and Disposition:   Home today if cleared by neuro team       All diagnosis and differential diagnosis have been reviewed; assessment and plan has been documented; I have personally reviewed the labs and test results that are presently available; I have reviewed the patients medication list; I have reviewed the consulting providers response and recommendations. I have reviewed or attempted to review medical records based upon their availability    All of the patient's questions have been  addressed and answered. Patient's is agreeable to the above stated plan. I will continue to monitor closely and make adjustments to medical management as needed.    Portions of this note dictated using EMR integrated voice recognition software, and may be subject to voice recognition errors not corrected at proofreading. Please contact writer for clarification if needed.   _____________________________________________________________________    Malnutrition Status:    Scheduled Med:   busPIRone  30 mg Oral BID    cyanocobalamin  100 mcg Oral Daily    cyclobenzaprine  10 mg Oral TID    docusate sodium  100 mg Oral BID    enoxparin  40 mg Subcutaneous Daily    gabapentin  800 mg Oral TID    lamoTRIgine  50 mg Oral BID    levothyroxine  25 mcg Oral Before breakfast    meperidine  12.5 mg Intravenous Once    methadone  10 mg Oral QID    mirtazapine  15 mg Oral QHS    multivitamin  1 tablet Oral Daily    ondansetron  4 mg Intravenous Once    pantoprazole  20 mg Oral BID    pravastatin  10 mg Oral QHS    propranoloL  10 mg Oral BID    sertraline  100 mg Oral BID    topiramate  50 mg Oral QHS       Continuous Infusions:       PRN Meds:    Current Facility-Administered Medications:     acetaminophen, 650 mg, Oral, Q6H PRN    albuterol-ipratropium, 3 mL, Nebulization, Once PRN    aluminum-magnesium hydroxide-simethicone, 30 mL, Oral, QID PRN    dextrose 10%, 12.5 g, Intravenous, PRN    dextrose 10%, 25 g, Intravenous, PRN    diphenhydrAMINE, 25 mg, Intravenous, Q6H PRN    glucagon (human recombinant), 1 mg, Intramuscular, PRN    HYDROcodone-acetaminophen, 1 tablet, Oral, Q3H PRN    HYDROmorphone, 0.2 mg, Intravenous, Q5 Min PRN    HYDROmorphone, 0.5 mg, Intravenous, Q5 Min PRN    hydrOXYzine HCL, 25 mg, Oral, TID PRN    LORazepam, 1 mg, Oral, Q6H PRN    melatonin, 6 mg, Oral, Nightly PRN    metoclopramide, 10 mg, Intravenous, Q10 Min PRN    naloxone, 0.02 mg, Intravenous, PRN    ondansetron, 4 mg, Intravenous, Q4H PRN    polyethylene glycol, 17 g, Oral, BID PRN    prochlorperazine, 5 mg, Intravenous, Q6H PRN    simethicone, 1 tablet, Oral, QID PRN     Radiology:  I have personally reviewed the following imaging and agree with the radiologist.     MRI Brain W WO Contrast  Narrative: EXAMINATION:  MRI BRAIN W WO CONTRAST    CLINICAL HISTORY:  Demyelinating disease;Seizure, new-onset, no history of trauma;    TECHNIQUE:  Multiplanar, multisequence MR images of the brain were obtained with and without administration of intravenous contrast.    COMPARISON:  CT head dated 08/14/2024    FINDINGS:  There is no restricted diffusion, hemorrhage or edema.  Few punctate scattered T2/FLAIR hyperintensities in the subcortical and periventricular white matter are nonspecific.  There is no abnormal parenchymal or leptomeningeal.    There is no mass effect or midline shift.  The basal cisterns are patent.  There is no hydrocephalus or abnormal extra-axial fluid collection.  The major intracranial flow voids are patent.  There is trace scattered paranasal sinus mucosal thickening.  Impression: 1. No acute intracranial  abnormality.  2. Few scattered T2/FLAIR hyperintensities in the supratentorial white matter are nonspecific.  No evidence of active demyelination.    Electronically signed by: Promise Ross  Date:    08/16/2024  Time:    13:17      Alexandro Chester MD  Department of Hospital Medicine   [facility]   08/17/2024

## 2024-08-17 NOTE — NURSING
Nurses Note -- 4 Eyes      8/17/2024       Skin assessed during: Q Shift Change      [] No Altered Skin Integrity Present    []Prevention Measures Documented      [] Yes- Altered Skin Integrity Present or Discovered   [] LDA Added if Not in Epic (Describe Wound)   [] New Altered Skin Integrity was Present on Admit and Documented in LDA   [] Wound Image Taken    Wound Care Consulted? No    Attending Nurse:  Keely Chery RN/Staff Member:  Vijay

## 2024-08-18 NOTE — PROGRESS NOTES
[facility]                       [address] [place] [address]    PATIENT NAME:       JASMIN THORNTON  YOB: 1970  CSN:                [id number]   MRN:                [id number]  ADMIT DATE:         2024 17:30:00  PHYSICIAN:          Glendy Hope MD                           TESTING    DATE OF SERVICE:  2024 00:00:00    STUDY PERFORMED:  24-hour EEG monitoring.    END OF STUDY:  2024.    DESCRIPTION OF TRACING:  This is a 24-hour EEG monitoring, was done with video   camera.  Study was done for seizure workup.  This 24-hour EEG monitoring was   done using 10/20 systems, using 21 channels.  The background activity is   consistent of about 9 hertz stage 2 sleep.  There were no sleep spindles.  There   was some EKG artifact and movement artifact throughout the study.  There is no   clear evidence of any epileptiform discharges.    CONCLUSION:  This 24-hour EEG monitoring is within normal limits.  There is no   clear evidence of any epileptiform discharges.  Clinical correlation is   suggested.        ______________________________  MD ZAKI Bedolla/AQS  DD:  2024  Time:  07:57PM  DT:  2024  Time:  02:18AM  Job #:  [id number]       TESTING

## 2024-08-18 NOTE — PROGRESS NOTES
[facility]                       [address] [place] [address]    PATIENT NAME:       JASMIN THORNTON  YOB: 1970  CSN:                [id number]   MRN:                [id number]  ADMIT DATE:         2024 17:30:00  PHYSICIAN:          Glendy Hope MD                           TESTING    DATE OF SERVICE:  2024 12:15:26    HISTORY OF PRESENT ILLNESS:  The patient was admitted for seizure workup.  She   had apparently what looks like a 4 to 5 breakthrough seizures described as   generalized tonic or clonic activity.  The patient stated that she did bite her   tongue in 2 occasions and there was questionable urinary incontinence.  We did   first EEG on the patient with regular EEG did not show any evidence of any   epilepsy.  I ordered a 24-hour EEG monitoring overnight.  I am going to repeat   test today.  The patient looks good today.  She is smiling.  She has less   stress.  She is on Lamictal 50 mg b.i.d.  I discontinued the Keppra.  Psychiatry   on the case.  Neuro examination is within normal limits.  She is alert, awake,   oriented x3.  Cranial nerve examination 2 through 12 is intact.  No localized   weakness.  She does have generalized weakness.  Gait was not tested.    Finger-to-nose, no dysmetria.  Deep tendon reflexes symmetrical +2.    ASSESSMENT AND PLAN:  Seizure versus pseudo-seizure.  I feel like if the 24-hour   EEG monitoring is negative, that most likely her seizure-like activity is to   stress and anxiety.  She has disabled kid or children.  She has a lot of stress   for that and the patient even told me that she needs to go back home to take   care of her disabled child.  Follow up with Psychiatry and follow up with me.  I   will see the patient in my office for further evaluation.  We will continue on   the Lamictal.  The patient will be discharged on Lamictal at 50 mg  b.i.d.  I   gave my instruction to the nurse to make sure we call the prescription to her   pharmacy, so she has a prescription of Lamictal and we will follow the patient   in my office.  It is okay to be discharged from neuro standpoint.        ______________________________  MD ZAKI Bedolla/ANAMARIA  DD:  2024  Time:  06:47PM  DT:  2024  Time:  01:51AM  Job #:  [id number]       TESTING

## 2024-08-21 ENCOUNTER — PATIENT OUTREACH (OUTPATIENT)
Dept: ADMINISTRATIVE | Facility: CLINIC | Age: 54
End: 2024-08-21
Payer: COMMERCIAL

## 2024-08-21 NOTE — PROGRESS NOTES
C3 nurse attempted to contact Jordan Gandara  for a TCC post hospital discharge follow up call. No answer. Left voicemail with callback information. The patient has a scheduled HOSFU appointment with Hanna Benitez MD (Family Medicine) on 8/22/2024 @ 3:00 pm

## 2024-08-22 NOTE — PROGRESS NOTES
C3 nurse made second attempt to contact Jordan Gandara for a TCC post hospital discharge follow up call.

## 2024-08-22 NOTE — PROGRESS NOTES
C3 nurse made third attempt to contact Jordan Gandara for a TCC post hospital discharge follow up call.

## 2024-08-24 NOTE — DISCHARGE SUMMARY
HPI:   Ms. Gandara is a 53 year old female with a pmh of GERD, IBS, hypothyroidism, BRAULIO, anxiety/depression, chronic low back pain who presented to the ED with seizure like activity.  She states that it started occurring yesterday. She states that it involves all 4 extremities, that she is awake and alert during the process. She states that these seizures only last a minute or two and has urinated on herself and bitten her tongue when it happens. Denies any post-ictal symptoms.     ED vitals on arrival:  Temp 98.3° F, pulse 98, resp 22, /65, SpO2 94% on RA.  Today's ED lab work revealed H&H 11/33.9, CO2 21, BUN/CR 24.1/2.37, , UA positive for opiates.  CT head without contrast showed some loss of gray-white differentiation in the frontal and parietal regions.  MRI correlation is recommended.  She was given Keppra, droperidol, Valium, and fluids in the ED.  She was admitted to Hospital Medicine for management.     Patient was continued with Keppra. Her symptoms seemed more like pseudoseizures vs panic attacks. Psych team was consulted and adjusted meds. She had no new episodes. Since she had an abnormal CT brain, a MRI was ordered.      She was other stable and at baseline. She is on home scheduled methadone. Advised to wean as much as possible. She will be discharged home if MRI brain comes back negative for acute changes. MRI brain came back negative. EEG was unremarkable. Seen by neuro team and keppra changed to Lamictal 50 mg BID. She was stable and asymptomatic. Discharged to home today if cleared by neuro team.        Interval Hx:   Patient awake and comfortable. Had no new episodes. Has been afebrile. Eating well.      Family at bedside, explained in detail about the patients condition, diagnosis, vitals, labs and treatment plan. They understand and agree with the plan. All their questions were answered.       At home she walks with a walker or uses a wheel chair      Case was discussed with  patient's nurse and  on the floor.     Objective/physical exam:  General: In no acute distress, obese, calm mood   Chest: Clear to auscultation bilaterally  Heart: RRR, +S1, S2, no appreciable murmur  Abdomen: Soft, nontender, BS +  Neurologic: Alert and oriented x4, Cranial nerve II-XII intact, Strength 5/5 in all 4 extremities        VITAL SIGNS: 24 HRS MIN & MAX LAST   Temp  Min: 97.3 °F (36.3 °C)  Max: 98.9 °F (37.2 °C) 97.7 °F (36.5 °C)   BP  Min: 101/71  Max: 155/91 101/71   Pulse  Min: 60  Max: 81  60   Resp  Min: 12  Max: 20 20   SpO2  Min: 92 %  Max: 97 % (!) 92 %      I have reviewed the following labs:        Recent Labs   Lab 08/14/24  1828 08/15/24  0607 08/16/24  0607   WBC 10.92 6.18 5.05   RBC 4.05* 3.56* 3.54*   HGB 11.0* 9.8* 9.9*   HCT 33.9* 30.7* 30.8*   MCV 83.7 86.2 87.0   MCH 27.2 27.5 28.0   MCHC 32.4* 31.9* 32.1*   RDW 13.9 14.1 14.1    157 154   MPV 9.5 9.5 9.7            Recent Labs   Lab 08/14/24  1828 08/15/24  0607 08/16/24  0607    143 142   K 3.8 3.5 3.6    110* 110*   CO2 21* 20* 23   BUN 24.1* 23.1* 12.7   CREATININE 2.37* 1.62* 0.83   CALCIUM 9.2 9.0 8.8   MG  --  2.20  --    ALBUMIN 4.0 3.6  --    ALKPHOS 163* 150  --    ALT 21 17  --    AST 28 23  --    BILITOT 0.4 0.4  --       Microbiology Results (last 7 days)         Procedure Component Value Units Date/Time     Urine culture [[id number]] Collected: 08/14/24 1757     Order Status: Completed Specimen: Urine Updated: 08/16/24 0856       Urine Culture No Growth                See below for Radiology     Assessment/Plan:  Panic attack vs seizures   H/O anxiety and depression   ARF  Obesity   Chronic methadone use      History:  GERD, IBS, hypothyroidism, BRAULIO, anxiety/depression, chronic low back pain      Plan:  Patient had no acute issues last night or today  No new episodes   On lamictal 50 mg BID. Off keppra per neuro team      MRI brain did not show any acute changes   EEG negative      She is  on chronic narcotics for back pain     S/B psych team and meds adjusted      Continue strict aspiration, fall and decubitus precautions          Continue supportive care      VTE prophylaxis: Lovenox      Patient condition:  Fair     Anticipated discharge and Disposition:   Home today if cleared by neuro team         All diagnosis and differential diagnosis have been reviewed; assessment and plan has been documented; I have personally reviewed the labs and test results that are presently available; I have reviewed the patients medication list; I have reviewed the consulting providers response and recommendations. I have reviewed or attempted to review medical records based upon their availability     All of the patient's questions have been  addressed and answered. Patient's is agreeable to the above stated plan. I will continue to monitor closely and make adjustments to medical management as needed.     Portions of this note dictated using EMR integrated voice recognition software, and may be subject to voice recognition errors not corrected at proofreading. Please contact writer for clarification if needed.   _____________________________________________________________________     Malnutrition Status:     Scheduled Med:   busPIRone  30 mg Oral BID    cyanocobalamin  100 mcg Oral Daily    cyclobenzaprine  10 mg Oral TID    docusate sodium  100 mg Oral BID    enoxparin  40 mg Subcutaneous Daily    gabapentin  800 mg Oral TID    lamoTRIgine  50 mg Oral BID    levothyroxine  25 mcg Oral Before breakfast    meperidine  12.5 mg Intravenous Once    methadone  10 mg Oral QID    mirtazapine  15 mg Oral QHS    multivitamin  1 tablet Oral Daily    ondansetron  4 mg Intravenous Once    pantoprazole  20 mg Oral BID    pravastatin  10 mg Oral QHS    propranoloL  10 mg Oral BID    sertraline  100 mg Oral BID    topiramate  50 mg Oral QHS      Continuous Infusions:        PRN Meds:     Current Facility-Administered Medications:      acetaminophen, 650 mg, Oral, Q6H PRN    albuterol-ipratropium, 3 mL, Nebulization, Once PRN    aluminum-magnesium hydroxide-simethicone, 30 mL, Oral, QID PRN    dextrose 10%, 12.5 g, Intravenous, PRN    dextrose 10%, 25 g, Intravenous, PRN    diphenhydrAMINE, 25 mg, Intravenous, Q6H PRN    glucagon (human recombinant), 1 mg, Intramuscular, PRN    HYDROcodone-acetaminophen, 1 tablet, Oral, Q3H PRN    HYDROmorphone, 0.2 mg, Intravenous, Q5 Min PRN    HYDROmorphone, 0.5 mg, Intravenous, Q5 Min PRN    hydrOXYzine HCL, 25 mg, Oral, TID PRN    LORazepam, 1 mg, Oral, Q6H PRN    melatonin, 6 mg, Oral, Nightly PRN    metoclopramide, 10 mg, Intravenous, Q10 Min PRN    naloxone, 0.02 mg, Intravenous, PRN    ondansetron, 4 mg, Intravenous, Q4H PRN    polyethylene glycol, 17 g, Oral, BID PRN    prochlorperazine, 5 mg, Intravenous, Q6H PRN    simethicone, 1 tablet, Oral, QID PRN      Radiology:  I have personally reviewed the following imaging and agree with the radiologist.      MRI Brain W WO Contrast  Narrative: EXAMINATION:  MRI BRAIN W WO CONTRAST     CLINICAL HISTORY:  Demyelinating disease;Seizure, new-onset, no history of trauma;     TECHNIQUE:  Multiplanar, multisequence MR images of the brain were obtained with and without administration of intravenous contrast.     COMPARISON:  CT head dated 08/14/2024     FINDINGS:  There is no restricted diffusion, hemorrhage or edema.  Few punctate scattered T2/FLAIR hyperintensities in the subcortical and periventricular white matter are nonspecific.  There is no abnormal parenchymal or leptomeningeal.     There is no mass effect or midline shift.  The basal cisterns are patent.  There is no hydrocephalus or abnormal extra-axial fluid collection.  The major intracranial flow voids are patent.  There is trace scattered paranasal sinus mucosal thickening.  Impression: 1. No acute intracranial abnormality.  2. Few scattered T2/FLAIR hyperintensities in the supratentorial white  matter are nonspecific.  No evidence of active demyelination.     Electronically signed by:Promise Ross  Date:                                                  08/16/2024  Time:                                                  13:17        Alexandro Chester MD  Department of Hospital Medicine   [facility]   08/17/2024                          Cleared by neuro team for discharge     Dc home     Dc 31 minutes

## 2024-08-30 ENCOUNTER — HOSPITAL ENCOUNTER (OUTPATIENT)
Dept: RADIOLOGY | Facility: HOSPITAL | Age: 54
Discharge: HOME | End: 2024-08-30
Attending: FAMILY MEDICINE
Payer: COMMERCIAL

## 2024-08-30 DIAGNOSIS — R05.9 COUGH: ICD-10-CM

## 2024-08-30 PROCEDURE — 71046 X-RAY EXAM CHEST 2 VIEWS: CPT | Mod: TC

## 2024-10-02 PROBLEM — M75.121 NONTRAUMATIC COMPLETE TEAR OF RIGHT ROTATOR CUFF: Status: ACTIVE | Noted: 2024-10-02

## 2024-10-25 NOTE — H&P (VIEW-ONLY)
Subjective:      Patient ID: Jordan Gandara is a 53 y.o. female.    Chief Complaint: Shoulder Pain (Right)      Past Medical History:   Diagnosis Date    Acute viral hepatitis, unspecified     Anemia, unspecified     Anxiety     Bipolar disorder, unspecified     Chronic lower back pain     Depression     Fibromyalgia     GERD (gastroesophageal reflux disease)     IBS (irritable bowel syndrome)     Left knee pain, unspecified chronicity     Neuropathy     Obesity     Personal history of fall     Rheumatoid arthritis, unspecified     Sleep apnea     Will need Bi Pap does not have yet    Stomach ulcer     Swelling of left knee joint     Thyroid disease     Tremors of nervous system      Past Surgical History:   Procedure Laterality Date    ADENOIDECTOMY      CERVICAL SPINE SURGERY      C5, C6 with plates and screws     SECTION      X1    CHOLECYSTECTOMY      DISC REMOVAL      FRACTURE SURGERY Right     wrist    KNEE ARTHROSCOPY W/ MENISCECTOMY Left 2023    Procedure: ARTHROSCOPY, KNEE, WITH MENISCECTOMY (LMT);  Surgeon: Britton Maya MD;  Location: [facility];  Service: Orthopedics;  Laterality: Left;  partial lateral meniscectomy    LAPAROSCOPIC SLEEVE GASTRECTOMY      MAGNETIC RESONANCE IMAGING N/A 5/15/2023    Procedure: MRI (Magnetic Resonance Imagine);  Surgeon: Judy Garvin MD;  Location: [facility];  Service: Anesthesiology;  Laterality: N/A;  MRI KNEE W/O CONTRAST WITH ANESTHESIA    MAGNETIC RESONANCE IMAGING N/A 2024    Procedure: MRI (Magnetic Resonance Imagine);  Surgeon: Ericka Garcia FNP;  Location: [facility];  Service: Medicine;  Laterality: N/A;  MRI WITH ANESTHESIA  //  BRAIN //  WITH AND WITHOUT CONTRAST    OOPHORECTOMY      ONE OVARY    ROBOTIC ABLATION OR EXCISION, ENDOMETRIOSIS      SPINAL FUSION      TONSILLECTOMY       Social History     Occupational History    Not on file   Tobacco Use    Smoking status: Never    Smokeless tobacco: Never   Substance and Sexual Activity     Alcohol use: Not Currently    Drug use: Not Currently     Comment: pain management    Sexual activity: Not on file      ROS  Current Outpatient Medications on File Prior to Visit   Medication Sig Dispense Refill    aspirin (ECOTRIN) 81 MG EC tablet Take 81 mg by mouth every evening. Stopped 6/7/23      busPIRone (BUSPAR) 30 MG Tab Take 30 mg by mouth 2 (two) times daily.      cevimeline (EVOXAC) 30 mg capsule Take 30 mg by mouth 3 (three) times daily.      cyanocobalamin (VITAMIN B-12) 100 MCG tablet Take 1 capsule by mouth every morning.      cyclobenzaprine (FLEXERIL) 10 MG tablet Take 10 mg by mouth 3 (three) times daily.      docusate sodium (COLACE) 100 MG capsule Take 100 mg by mouth 2 (two) times daily.      furosemide (LASIX) 20 MG tablet Take 20 mg by mouth every morning.      gabapentin (NEURONTIN) 800 MG tablet Take 800 mg by mouth 3 (three) times daily.      lamoTRIgine (LAMICTAL) 25 MG tablet Take 2 tablets (50 mg total) by mouth 2 (two) times daily. 120 tablet 11    levothyroxine (SYNTHROID) 25 MCG tablet Take 25 mcg by mouth before breakfast.      methadone (DOLOPHINE) 10 MG tablet Take 10 mg by mouth 4 (four) times daily.      mirtazapine (REMERON) 15 MG tablet Take 1 tablet (15 mg total) by mouth every evening. 30 tablet 11    multivitamin (THERAGRAN) per tablet Take 1 tablet by mouth 2 (two) times a day.      pantoprazole (PROTONIX) 40 MG tablet Take 20 mg by mouth 2 (two) times a day.      pravastatin (PRAVACHOL) 10 MG tablet Take 10 mg by mouth every evening.      propranoloL (INDERAL) 20 MG tablet Take 10 mg by mouth 2 (two) times daily.      senna (SENOKOT) 8.6 mg tablet Take 17.2 mg by mouth every evening.      sertraline (ZOLOFT) 100 MG tablet Take 100 mg by mouth 2 (two) times a day.      topiramate (TROKENDI XR) 50 mg Cp24 Take 1 capsule by mouth every evening.      trospium (SANCTURA) 20 mg Tab tablet Take 20 mg by mouth 2 (two) times daily.       No current facility-administered  "medications on file prior to visit.     Review of patient's allergies indicates:   Allergen Reactions    Diclofenac      Other reaction(s): nausea, vomiting    Nsaids (non-steroidal anti-inflammatory drug)     Toradol [ketorolac] Nausea Only     severe         Objective:      Vitals:    10/23/24 1520   Weight: 108.9 kg (240 lb)   Height: 5' 4" (1.626 m)     Ortho Exam       Assessment:       1. Nontraumatic complete tear of right rotator cuff          Plan:       Jordan Gandara is a 53 y.o. female who presents with RT shoulder rotator cuff tear.     -had a long and thorough discussion with the patient today where we talked about nonoperative and operative options.  She has tried injections and home exercises.  She did get a day or 2 relief which is a good sign that she is hurting in that area.  At this point she is interested in surgery.  I discussed surgical options from debridement partial rotator cuff repair, rotator cuff repair, rotator cuff repair with augmentation, superior capsular reconstruction with allograft.  After discussion we elected to proceed with right shoulder arthroscopy with rotator cuff repair versus rotator cuff repair with allograft augmentation versus superior capsular reconstruction with allograft, also subacromial decompression and possible biceps tenodesis.  During surgery we will evaluate the rotator cuff tendon and if we are able to repair it we will if it does not come over we will need to do a superior capsular reconstruction with a dermal allograft and then repair what we can anteriorly and posteriorly.  She understands that she does have a little bit of humeral head elevation and that the supraspinatus may not come all the way over and so we may need to do a superior capsular reconstruction, with that being said she understands the risk that this may not get her back to full range of motion and strength and that she still may have some residual pain.  She understands she has " significant pain that has a little bit of out of proportion from the injury showing an MRI and so that this surgery may not provide complete relief.  She understands and still would like to proceed.     -plan for surgery in [facility] November 20, 2024.  There is a current saline shortage so she understands the surgery may need to be moved back        Luis Fernando Matson MD

## 2024-11-18 ENCOUNTER — APPOINTMENT (OUTPATIENT)
Dept: RESPIRATORY THERAPY | Facility: HOSPITAL | Age: 54
End: 2024-11-18
Attending: STUDENT IN AN ORGANIZED HEALTH CARE EDUCATION/TRAINING PROGRAM
Payer: COMMERCIAL

## 2024-11-18 RX ORDER — OXYBUTYNIN CHLORIDE 10 MG/1
10 TABLET, EXTENDED RELEASE ORAL
COMMUNITY
Start: 2024-06-08

## 2024-11-18 RX ORDER — TOPIRAMATE 100 MG/1
1 CAPSULE, EXTENDED RELEASE ORAL NIGHTLY
COMMUNITY
Start: 2024-11-05

## 2024-11-18 RX ORDER — ONDANSETRON 4 MG/1
4 TABLET, FILM COATED ORAL EVERY 8 HOURS PRN
COMMUNITY

## 2024-11-18 RX ORDER — ALBUTEROL SULFATE 90 UG/1
2 INHALANT RESPIRATORY (INHALATION) EVERY 6 HOURS PRN
COMMUNITY
Start: 2024-09-07

## 2024-11-18 RX ORDER — PANTOPRAZOLE SODIUM 40 MG/1
1 TABLET, DELAYED RELEASE ORAL EVERY MORNING
COMMUNITY

## 2024-11-19 ENCOUNTER — ANESTHESIA EVENT (OUTPATIENT)
Dept: SURGERY | Facility: HOSPITAL | Age: 54
End: 2024-11-19
Payer: COMMERCIAL

## 2024-11-19 NOTE — ANESTHESIA PREPROCEDURE EVALUATION
11/19/2024  Jordan Gandara is a 53 y.o., female.      Pre-op Assessment    I have reviewed the Patient Summary Reports.     I have reviewed the Nursing Notes. I have reviewed the NPO Status.   I have reviewed the Medications.     Review of Systems  Anesthesia Hx:             Denies Family Hx of Anesthesia complications.    Denies Personal Hx of Anesthesia complications.                    Social:  No Alcohol Use, Non-Smoker       Hematology/Oncology:  Hematology Normal   Oncology Normal                                   EENT/Dental:  EENT/Dental Normal           Cardiovascular:                   ECG has been reviewed.                            Pulmonary:        Sleep Apnea                Renal/:  Renal/ Normal                 Hepatic/GI:   PUD,  GERD, well controlled Liver Disease, Hepatitis              Musculoskeletal:  Musculoskeletal Normal                Neurological:    Neuromuscular Disease,                                   Endocrine:  Endocrine Normal            Dermatological:  Skin Normal    Psych:  Psychiatric History                  Physical Exam  General: Cooperative, Alert and Oriented    Airway:  Mallampati: II   Mouth Opening: Normal  TM Distance: Normal  Tongue: Normal  Neck ROM: Normal ROM    Dental:  Intact        Anesthesia Plan  Type of Anesthesia, risks & benefits discussed:    Anesthesia Type: Gen ETT, Regional  Intra-op Monitoring Plan: Standard ASA Monitors  Post Op Pain Control Plan: multimodal analgesia  Induction:  IV  Airway Plan: Direct  Informed Consent: Informed consent signed with the Patient and all parties understand the risks and agree with anesthesia plan.  All questions answered. Patient consented to blood products? Yes  ASA Score: 3    Ready For Surgery From Anesthesia Perspective.     .

## 2024-11-20 ENCOUNTER — HOSPITAL ENCOUNTER (OUTPATIENT)
Facility: HOSPITAL | Age: 54
Discharge: HOME | End: 2024-11-20
Attending: STUDENT IN AN ORGANIZED HEALTH CARE EDUCATION/TRAINING PROGRAM | Admitting: STUDENT IN AN ORGANIZED HEALTH CARE EDUCATION/TRAINING PROGRAM
Payer: COMMERCIAL

## 2024-11-20 ENCOUNTER — ANESTHESIA (OUTPATIENT)
Dept: SURGERY | Facility: HOSPITAL | Age: 54
End: 2024-11-20
Payer: COMMERCIAL

## 2024-11-20 VITALS
TEMPERATURE: 98 F | HEIGHT: 64 IN | WEIGHT: 238.13 LBS | HEART RATE: 73 BPM | SYSTOLIC BLOOD PRESSURE: 117 MMHG | RESPIRATION RATE: 18 BRPM | DIASTOLIC BLOOD PRESSURE: 65 MMHG | OXYGEN SATURATION: 95 % | BODY MASS INDEX: 40.65 KG/M2

## 2024-11-20 DIAGNOSIS — M75.121 NONTRAUMATIC COMPLETE TEAR OF RIGHT ROTATOR CUFF: Primary | ICD-10-CM

## 2024-11-20 LAB — B-HCG UR QL: NEGATIVE

## 2024-11-20 PROCEDURE — 27201423 OPTIME MED/SURG SUP & DEVICES STERILE SUPPLY: Performed by: STUDENT IN AN ORGANIZED HEALTH CARE EDUCATION/TRAINING PROGRAM

## 2024-11-20 PROCEDURE — 25000003 PHARM REV CODE 250: Performed by: ANESTHESIOLOGY

## 2024-11-20 PROCEDURE — 25000003 PHARM REV CODE 250: Performed by: STUDENT IN AN ORGANIZED HEALTH CARE EDUCATION/TRAINING PROGRAM

## 2024-11-20 PROCEDURE — 63600175 PHARM REV CODE 636 W HCPCS: Performed by: STUDENT IN AN ORGANIZED HEALTH CARE EDUCATION/TRAINING PROGRAM

## 2024-11-20 PROCEDURE — 36000711: Performed by: STUDENT IN AN ORGANIZED HEALTH CARE EDUCATION/TRAINING PROGRAM

## 2024-11-20 PROCEDURE — 63600175 PHARM REV CODE 636 W HCPCS: Performed by: NURSE ANESTHETIST, CERTIFIED REGISTERED

## 2024-11-20 PROCEDURE — 63600175 PHARM REV CODE 636 W HCPCS: Performed by: ANESTHESIOLOGY

## 2024-11-20 PROCEDURE — 71000016 HC POSTOP RECOV ADDL HR: Performed by: STUDENT IN AN ORGANIZED HEALTH CARE EDUCATION/TRAINING PROGRAM

## 2024-11-20 PROCEDURE — 25000003 PHARM REV CODE 250: Performed by: NURSE ANESTHETIST, CERTIFIED REGISTERED

## 2024-11-20 PROCEDURE — 71000033 HC RECOVERY, INTIAL HOUR: Performed by: STUDENT IN AN ORGANIZED HEALTH CARE EDUCATION/TRAINING PROGRAM

## 2024-11-20 PROCEDURE — 81025 URINE PREGNANCY TEST: CPT | Performed by: ANESTHESIOLOGY

## 2024-11-20 PROCEDURE — 37000008 HC ANESTHESIA 1ST 15 MINUTES: Performed by: STUDENT IN AN ORGANIZED HEALTH CARE EDUCATION/TRAINING PROGRAM

## 2024-11-20 PROCEDURE — 71000015 HC POSTOP RECOV 1ST HR: Performed by: STUDENT IN AN ORGANIZED HEALTH CARE EDUCATION/TRAINING PROGRAM

## 2024-11-20 PROCEDURE — 36000710: Performed by: STUDENT IN AN ORGANIZED HEALTH CARE EDUCATION/TRAINING PROGRAM

## 2024-11-20 PROCEDURE — 37000009 HC ANESTHESIA EA ADD 15 MINS: Performed by: STUDENT IN AN ORGANIZED HEALTH CARE EDUCATION/TRAINING PROGRAM

## 2024-11-20 PROCEDURE — C1713 ANCHOR/SCREW BN/BN,TIS/BN: HCPCS | Performed by: STUDENT IN AN ORGANIZED HEALTH CARE EDUCATION/TRAINING PROGRAM

## 2024-11-20 DEVICE — SUTR ANCHBIO-COMP S-TAK KNOTLESS
Type: IMPLANTABLE DEVICE | Site: SHOULDER | Status: FUNCTIONAL
Brand: ARTHREX®

## 2024-11-20 DEVICE — 4.75MM BC KNOTLESS SWIVELOCK W/ TAPE
Type: IMPLANTABLE DEVICE | Site: SHOULDER | Status: FUNCTIONAL
Brand: ARTHREX®

## 2024-11-20 DEVICE — ANCHOR SUT BIOCOM 5.5X19.1MM: Type: IMPLANTABLE DEVICE | Site: SHOULDER | Status: FUNCTIONAL

## 2024-11-20 RX ORDER — FENTANYL CITRATE 50 UG/ML
25 INJECTION, SOLUTION INTRAMUSCULAR; INTRAVENOUS EVERY 5 MIN PRN
Status: DISCONTINUED | OUTPATIENT
Start: 2024-11-20 | End: 2024-11-20

## 2024-11-20 RX ORDER — PHENYLEPHRINE HYDROCHLORIDE 10 MG/ML
INJECTION INTRAVENOUS
Status: DISCONTINUED | OUTPATIENT
Start: 2024-11-20 | End: 2024-11-20

## 2024-11-20 RX ORDER — PROPOFOL 10 MG/ML
VIAL (ML) INTRAVENOUS
Status: DISCONTINUED | OUTPATIENT
Start: 2024-11-20 | End: 2024-11-20

## 2024-11-20 RX ORDER — ACETAMINOPHEN 500 MG
1000 TABLET ORAL
Status: COMPLETED | OUTPATIENT
Start: 2024-11-20 | End: 2024-11-20

## 2024-11-20 RX ORDER — ASPIRIN 81 MG/1
81 TABLET ORAL DAILY
Qty: 28 TABLET | Refills: 0 | Status: SHIPPED | OUTPATIENT
Start: 2024-11-20 | End: 2024-12-18

## 2024-11-20 RX ORDER — EPINEPHRINE 1 MG/ML
INJECTION, SOLUTION, CONCENTRATE INTRAVENOUS
Status: DISCONTINUED | OUTPATIENT
Start: 2024-11-20 | End: 2024-11-20 | Stop reason: HOSPADM

## 2024-11-20 RX ORDER — MUPIROCIN 20 MG/G
OINTMENT TOPICAL
Status: DISCONTINUED | OUTPATIENT
Start: 2024-11-20 | End: 2024-11-20 | Stop reason: HOSPADM

## 2024-11-20 RX ORDER — GLUCAGON 1 MG
1 KIT INJECTION
Status: DISCONTINUED | OUTPATIENT
Start: 2024-11-20 | End: 2024-11-20

## 2024-11-20 RX ORDER — HYDROMORPHONE HYDROCHLORIDE 2 MG/ML
1 INJECTION, SOLUTION INTRAMUSCULAR; INTRAVENOUS; SUBCUTANEOUS
Status: COMPLETED | OUTPATIENT
Start: 2024-11-20 | End: 2024-11-20

## 2024-11-20 RX ORDER — GABAPENTIN 300 MG/1
600 CAPSULE ORAL
Status: COMPLETED | OUTPATIENT
Start: 2024-11-20 | End: 2024-11-20

## 2024-11-20 RX ORDER — EPHEDRINE SULFATE 50 MG/ML
INJECTION, SOLUTION INTRAVENOUS
Status: DISCONTINUED | OUTPATIENT
Start: 2024-11-20 | End: 2024-11-20

## 2024-11-20 RX ORDER — DEXAMETHASONE SODIUM PHOSPHATE 10 MG/ML
INJECTION INTRAMUSCULAR; INTRAVENOUS
Status: COMPLETED | OUTPATIENT
Start: 2024-11-20 | End: 2024-11-20

## 2024-11-20 RX ORDER — HYDROMORPHONE HYDROCHLORIDE 2 MG/ML
0.2 INJECTION, SOLUTION INTRAMUSCULAR; INTRAVENOUS; SUBCUTANEOUS EVERY 5 MIN PRN
Status: DISCONTINUED | OUTPATIENT
Start: 2024-11-20 | End: 2024-11-20

## 2024-11-20 RX ORDER — BUPIVACAINE HYDROCHLORIDE 2.5 MG/ML
INJECTION, SOLUTION EPIDURAL; INFILTRATION; INTRACAUDAL
Status: COMPLETED | OUTPATIENT
Start: 2024-11-20 | End: 2024-11-20

## 2024-11-20 RX ORDER — ONDANSETRON 4 MG/1
8 TABLET, ORALLY DISINTEGRATING ORAL EVERY 8 HOURS PRN
Qty: 42 TABLET | Refills: 0 | Status: SHIPPED | OUTPATIENT
Start: 2024-11-20 | End: 2024-11-27

## 2024-11-20 RX ORDER — LIDOCAINE HYDROCHLORIDE AND EPINEPHRINE 10; 10 UG/ML; MG/ML
INJECTION, SOLUTION INFILTRATION; PERINEURAL
Status: DISCONTINUED | OUTPATIENT
Start: 2024-11-20 | End: 2024-11-20 | Stop reason: HOSPADM

## 2024-11-20 RX ORDER — CEFAZOLIN SODIUM 1 G/3ML
2 INJECTION, POWDER, FOR SOLUTION INTRAMUSCULAR; INTRAVENOUS
Status: COMPLETED | OUTPATIENT
Start: 2024-11-20 | End: 2024-11-20

## 2024-11-20 RX ORDER — DEXMEDETOMIDINE HYDROCHLORIDE 100 UG/ML
INJECTION, SOLUTION INTRAVENOUS
Status: DISCONTINUED | OUTPATIENT
Start: 2024-11-20 | End: 2024-11-20

## 2024-11-20 RX ORDER — FENTANYL CITRATE 50 UG/ML
INJECTION, SOLUTION INTRAMUSCULAR; INTRAVENOUS
Status: DISCONTINUED | OUTPATIENT
Start: 2024-11-20 | End: 2024-11-20

## 2024-11-20 RX ORDER — DEXAMETHASONE SODIUM PHOSPHATE 4 MG/ML
INJECTION, SOLUTION INTRA-ARTICULAR; INTRALESIONAL; INTRAMUSCULAR; INTRAVENOUS; SOFT TISSUE
Status: DISCONTINUED | OUTPATIENT
Start: 2024-11-20 | End: 2024-11-20

## 2024-11-20 RX ORDER — DIAZEPAM 5 MG/1
10 TABLET ORAL
Status: COMPLETED | OUTPATIENT
Start: 2024-11-20 | End: 2024-11-20

## 2024-11-20 RX ORDER — MIDAZOLAM HYDROCHLORIDE 1 MG/ML
INJECTION INTRAMUSCULAR; INTRAVENOUS
Status: DISCONTINUED | OUTPATIENT
Start: 2024-11-20 | End: 2024-11-20

## 2024-11-20 RX ORDER — GLYCOPYRROLATE 0.2 MG/ML
INJECTION INTRAMUSCULAR; INTRAVENOUS
Status: DISCONTINUED | OUTPATIENT
Start: 2024-11-20 | End: 2024-11-20

## 2024-11-20 RX ORDER — TRANEXAMIC ACID 100 MG/ML
INJECTION, SOLUTION INTRAVENOUS
Status: DISCONTINUED | OUTPATIENT
Start: 2024-11-20 | End: 2024-11-20

## 2024-11-20 RX ORDER — ROCURONIUM BROMIDE 10 MG/ML
INJECTION, SOLUTION INTRAVENOUS
Status: DISCONTINUED | OUTPATIENT
Start: 2024-11-20 | End: 2024-11-20

## 2024-11-20 RX ORDER — ONDANSETRON HYDROCHLORIDE 2 MG/ML
INJECTION, SOLUTION INTRAVENOUS
Status: DISCONTINUED | OUTPATIENT
Start: 2024-11-20 | End: 2024-11-20

## 2024-11-20 RX ORDER — NEOSTIGMINE METHYLSULFATE 1 MG/ML
INJECTION INTRAVENOUS
Status: DISCONTINUED | OUTPATIENT
Start: 2024-11-20 | End: 2024-11-20

## 2024-11-20 RX ORDER — DROPERIDOL 2.5 MG/ML
0.62 INJECTION, SOLUTION INTRAMUSCULAR; INTRAVENOUS ONCE AS NEEDED
Status: DISCONTINUED | OUTPATIENT
Start: 2024-11-20 | End: 2024-11-20

## 2024-11-20 RX ORDER — LIDOCAINE HYDROCHLORIDE 20 MG/ML
INJECTION, SOLUTION EPIDURAL; INFILTRATION; INTRACAUDAL; PERINEURAL
Status: DISCONTINUED | OUTPATIENT
Start: 2024-11-20 | End: 2024-11-20

## 2024-11-20 RX ADMIN — BUPIVACAINE HYDROCHLORIDE 10 ML: 2.5 INJECTION, SOLUTION EPIDURAL; INFILTRATION; INTRACAUDAL; PERINEURAL at 07:11

## 2024-11-20 RX ADMIN — ROCURONIUM BROMIDE 50 MG: 10 INJECTION, SOLUTION INTRAVENOUS at 07:11

## 2024-11-20 RX ADMIN — DEXAMETHASONE SODIUM PHOSPHATE 4 MG: 10 INJECTION INTRAMUSCULAR; INTRAVENOUS at 07:11

## 2024-11-20 RX ADMIN — FENTANYL CITRATE 50 MCG: 50 INJECTION, SOLUTION INTRAMUSCULAR; INTRAVENOUS at 06:11

## 2024-11-20 RX ADMIN — MUPIROCIN 1 G: 20 OINTMENT TOPICAL at 06:11

## 2024-11-20 RX ADMIN — CEFAZOLIN 2 G: 330 INJECTION, POWDER, FOR SOLUTION INTRAMUSCULAR; INTRAVENOUS at 07:11

## 2024-11-20 RX ADMIN — EPHEDRINE SULFATE 20 MG: 50 INJECTION INTRAVENOUS at 08:11

## 2024-11-20 RX ADMIN — PHENYLEPHRINE HYDROCHLORIDE 100 MCG: 10 INJECTION INTRAVENOUS at 09:11

## 2024-11-20 RX ADMIN — PHENYLEPHRINE HYDROCHLORIDE 50 MCG: 10 INJECTION INTRAVENOUS at 09:11

## 2024-11-20 RX ADMIN — PROPOFOL 150 MG: 10 INJECTION, EMULSION INTRAVENOUS at 07:11

## 2024-11-20 RX ADMIN — PHENYLEPHRINE HYDROCHLORIDE 50 MCG: 10 INJECTION INTRAVENOUS at 08:11

## 2024-11-20 RX ADMIN — EPHEDRINE SULFATE 10 MG: 50 INJECTION INTRAVENOUS at 07:11

## 2024-11-20 RX ADMIN — LIDOCAINE HYDROCHLORIDE 100 MG: 20 INJECTION, SOLUTION EPIDURAL; INFILTRATION; INTRACAUDAL; PERINEURAL at 07:11

## 2024-11-20 RX ADMIN — DIAZEPAM 10 MG: 5 TABLET ORAL at 06:11

## 2024-11-20 RX ADMIN — DEXAMETHASONE SODIUM PHOSPHATE 4 MG: 4 INJECTION, SOLUTION INTRA-ARTICULAR; INTRALESIONAL; INTRAMUSCULAR; INTRAVENOUS; SOFT TISSUE at 07:11

## 2024-11-20 RX ADMIN — MIDAZOLAM 2 MG: 1 INJECTION INTRAMUSCULAR; INTRAVENOUS at 06:11

## 2024-11-20 RX ADMIN — HYDROMORPHONE HYDROCHLORIDE 1 MG: 2 INJECTION, SOLUTION INTRAMUSCULAR; INTRAVENOUS; SUBCUTANEOUS at 06:11

## 2024-11-20 RX ADMIN — SODIUM CHLORIDE, POTASSIUM CHLORIDE, SODIUM LACTATE AND CALCIUM CHLORIDE: 600; 310; 30; 20 INJECTION, SOLUTION INTRAVENOUS at 08:11

## 2024-11-20 RX ADMIN — GABAPENTIN 600 MG: 300 CAPSULE ORAL at 06:11

## 2024-11-20 RX ADMIN — SODIUM CHLORIDE, POTASSIUM CHLORIDE, SODIUM LACTATE AND CALCIUM CHLORIDE: 600; 310; 30; 20 INJECTION, SOLUTION INTRAVENOUS at 06:11

## 2024-11-20 RX ADMIN — GLYCOPYRROLATE 0.2 MG: 0.2 INJECTION INTRAMUSCULAR; INTRAVENOUS at 08:11

## 2024-11-20 RX ADMIN — NEOSTIGMINE METHYLSULFATE 1 MG: 1 INJECTION INTRAVENOUS at 09:11

## 2024-11-20 RX ADMIN — ONDANSETRON 4 MG: 2 INJECTION INTRAMUSCULAR; INTRAVENOUS at 07:11

## 2024-11-20 RX ADMIN — TRANEXAMIC ACID 1000 MG: 100 INJECTION INTRAVENOUS at 07:11

## 2024-11-20 RX ADMIN — GLYCOPYRROLATE 0.2 MG: 0.2 INJECTION INTRAMUSCULAR; INTRAVENOUS at 09:11

## 2024-11-20 RX ADMIN — DEXMEDETOMIDINE 30 MCG: 200 INJECTION, SOLUTION INTRAVENOUS at 07:11

## 2024-11-20 RX ADMIN — ACETAMINOPHEN 1000 MG: 500 TABLET, FILM COATED ORAL at 06:11

## 2024-11-20 NOTE — ANESTHESIA POSTPROCEDURE EVALUATION
Anesthesia Post Evaluation    Patient: Jordan Gandara    Procedure(s) Performed: Procedure(s) (LRB):  REPAIR, ROTATOR CUFF, ARTHROSCOPIC (Right)    Final Anesthesia Type: general      Patient location during evaluation: PACU  Patient participation: Yes- Able to Participate  Level of consciousness: awake  Post-procedure vital signs: reviewed and stable  Pain management: adequate  Airway patency: patent    PONV status at discharge: No PONV  Anesthetic complications: no      Cardiovascular status: stable  Respiratory status: unassisted, spontaneous ventilation and face mask  Hydration status: euvolemic  Follow-up not needed.              Vitals Value Taken Time   /78 11/20/24 1013   Temp 36.4 °C (97.5 °F) 11/20/24 1014   Pulse 81 11/20/24 1015   Resp 18 11/20/24 1014   SpO2 90 % 11/20/24 1015         Event Time   Out of Recovery 10:05:31         Pain/Susanna Score: Pain Rating Prior to Med Admin: 5 (11/20/2024  6:44 AM)  Susanna Score: 8 (11/20/2024 10:00 AM)

## 2024-11-20 NOTE — ANESTHESIA PROCEDURE NOTES
Right interscalene block    Patient location during procedure: OR   Block not for primary anesthetic.  Reason for block: at surgeon's request and post-op pain management   Post-op Pain Location: Right shoulder   Start time: 11/20/2024 6:58 AM  Timeout: 11/20/2024 6:57 AM   End time: 11/20/2024 7:01 AM    Staffing  Authorizing Provider: Sandro Ortega DO  Performing Provider: Sundar Hampton CRNA    Staffing  Performed by: Sundar Hampton CRNA  Authorized by: Sandro Ortega DO    Preanesthetic Checklist  Completed: patient identified, IV checked, site marked, risks and benefits discussed, surgical consent, monitors and equipment checked, pre-op evaluation and timeout performed  Peripheral Block  Patient position: sitting  Prep: ChloraPrep  Patient monitoring: heart rate, cardiac monitor, continuous pulse ox, continuous capnometry and frequent blood pressure checks  Block type: interscalene  Laterality: right  Injection technique: single shot  Needle  Needle type: Stimuplex   Needle gauge: 22 G  Needle length: 2 in  Needle localization: ultrasound guidance   -ultrasound image captured on disc.  Assessment  Injection assessment: negative aspiration, negative parasthesia and local visualized surrounding nerve  Pain Tolerance: comfortable throughout block and no complaints  Medications:    Medications: bupivacaine (pf) (MARCAINE) injection 0.25% - Perineural   10 mL - 11/20/2024 7:01:00 AM  dexAMETHasone sodium phos (PF) injection 10 mg/mL - Other   4 mg - 11/20/2024 7:01:00 AM    Additional Notes  + dexmedetomidine 30mcg

## 2024-11-20 NOTE — ANESTHESIA PROCEDURE NOTES
Intubation    Date/Time: 11/20/2024 7:06 AM    Performed by: Sundar Hampton CRNA  Authorized by: Sandro Ortega DO    Intubation:     Induction:  Intravenous    Intubated:  Postinduction    Mask Ventilation:  Easy mask    Attempts:  1    Attempted By:  CRNA    Method of Intubation:  Direct    Blade:  Dukes 2    Laryngeal View Grade: Grade I - full view of cords      Difficult Airway Encountered?: No      Complications:  None    Airway Device:  Oral endotracheal tube    Airway Device Size:  7.5    Style/Cuff Inflation:  Cuffed (inflated to minimal occlusive pressure)    Tube secured:  21    Secured at:  The lips    Placement Verified By:  Capnometry and Colorimetric ETCO2 device    Complicating Factors:  None    Findings Post-Intubation:  BS equal bilateral and atraumatic/condition of teeth unchanged

## 2024-11-20 NOTE — OP NOTE
[facility] - Periop Services  Operative Note      Date of Procedure: 11/20/2024     Procedure:  Right shoulder arthroscopic assisted 3 tendon rotator cuff repair (78047 plus 22-modifier)  Right shoulder arthroscopic assisted extensive debridement including labrum subacromial bursa and CA ligament    Procedure complexity:  The rotator cuff tear included the subscapularis supraspinatus and infraspinatus.  All 3 of these were repaired.  This makes the procedure more complex requiring additional portals additional anchors in overall increase in surgical time.  As such this 64431-71 procedure is 150% more complex than the typical 01825 procedure.    Surgeons and Role:     * Luis Fernando Matson MD - Primary    Assisting Surgeon: None    Pre-Operative Diagnosis: Nontraumatic complete tear of right rotator cuff [M75.121]    Post-Operative Diagnosis:  Right shoulder Upper 1/3 retracted subscapularis tear, complete supraspinatus tear with delamination, anterior 1/2 infraspinatus tear.  Fraying of the anterior and superior labrum, rupture of the long head biceps tendon.    Anesthesia: General    Operative Findings (including complications, if any): Right shoulder Upper 1/3 retracted subscapularis tear, complete supraspinatus tear with delamination, anterior 1/2 infraspinatus tear.  Fraying of the anterior and superior labrum, rupture of the long head biceps tendon.  Some mild grade 1 arthritic changes on the glenoid humeral cartilage intact without lesion..    Indication for procedure: Patient is a 53-year-old female who has been having right shoulder pain for some time she has tried nonoperative management.  She has failed this and continues to have pain in the right shoulder.  We discussed options continuing nonoperative management versus surgical intervention.  Her exam and MRI showed a complete tear of the supraspinatus tendon.  There was quite a bit of retraction noted and so the options we discussed was  right shoulder scope rotator cuff repair partial cuff repair simple debridement with biceps tenodesis, possible superior capsular reconstruction.  We discussed that once we were in the shoulder with the scope we would be able to have a better idea as to what was possible and we would do the best thing for her to get more motion and decreased pain.  Patient was amenable to this we discussed the wrist she was understanding and elected to proceed with surgery.    Description of Technical Procedures:   Patient was taken to the operating room supine on the operating table general anesthesia provided.  She was placed in the beach chair position elevated 25° and appropriately padded with the head stabilized.  2 g of Ancef 1 g TXA were provided.  Time-out had identifying right shoulder as the correct operative site.  On exam under anesthesia she had full range of motion with no instability noted.  We then injected 10 cc of 1% lidocaine with epinephrine into the subacromial space.  We then prepped and draped the right shoulder in a normal sterile fashion and placed the arm into the Arthrex arm berg.  We made our standard posterolateral portal.  Upon entering the joint we noted fraying of the superior and anterior labrum which was debrided with the shaver through a lateral portal going through her cuff tear.  Of note her biceps tendon was not present within the joint likely previously torn and retracted out of the joint.  We noted that she had a tear of the superior 1/3 of the subscap.  She had a tear of the supraspinatus with delamination noted and the anterior 1/3 of the infraspinatus was torn.  The posterior remaining cuff was intact.  No loose bodies found in the pouch.  Some grade 1 changes of the glenoid no cartilage lesions noted of the humeral head.  Through the lateral portal we are able to use a loop stitch to grab the upper 1/3 of the subscap.  We made an additional portal in the anterior aspect of the shoulder.   The suture was removed through this cannula.  We shaved and debrided the footprint of the upper 1/3 of the subscapularis.  We then used the loop stitch and a SwiveLock anchor to place it at the footprint.  We then used the knotless mechanism from the anchor to passive external stitch into the upper border of the subscap.  Final pictures of the subscap repair were taken we are satisfied with the repair.  We then moved to the subacromial space.  We debrided the subacromial bursa and the CA ligament clearing the inflammatory tissue and making space for our repair.  Additional portals IV access and anchor placement.  The footprint was debrided.  We used a cuff grasper and pull traction on both the superior delaminated portion in the inferior portion and noted that both were able to get back to the footprint but the inferior delaminated portion was slightly shortened so we debrided superior and inferior to the rotator cuff we were trying to repair.  We then medialized the footprint about 2 mm to allow for a repair.  In order to better reduce the delaminated portion we placed a Arthrex knotless FiberTak at the medial edge of the middle of the footprint.  We then passed using a scorpion in the inferior delaminated portion going inferior to superior then down superior to inferior.  The knotless mechanism was used but we did not tension yet at this point.  We paid placed our Arthrex medial row SwiveLock anterior and posterior at this point.  Bur was used to prepare the footprint.  We then used a loop stitch to pass the sutures from the medial row anchors in the appropriate position we felt that we were able get through both the inferior and superior delaminated portion.  We then used the knotless mechanism from 1 of the sutures to make a medial compression stitch going from posterior to anterior at the medial aspect of the footprint.  We then pulled tension on the cuff and the previous lead passed knotless mechanism through  the inferior delaminated portion was tensioned which we were able to see that it pulled down the inferior delaminated portion to the medial aspect of the footprint.  This was then cut.  This wedges of the medial row anchors were cut.  One of the sutures from the posterior aspect and 1 from the anterior aspect were brought out through the lateral portal.  They were then placed into the Arthrex SwiveLock.  The we placed our posterolateral 1st.  Then using the knotless mechanism sutures from the anchor we passed an additional stitch through the posterior cuff.  We repeated this for the anterior lateral row and passed an additional stitch through the anterior cuff.  We then retention our medial compression stitch.  Final images were taken.  We are satisfied with our repair at this point and excess saline was removed.  2-0 Vicryl used for the subcutaneous tissue of the larger portals and then 2-0 nylon was used to close the skin.  Xeroform 4x4s ABD tape was placed.  Patient was placed in an abduction sling.    Significant Surgical Tasks Conducted by the Assistant(s), if Applicable: N/A    Estimated Blood Loss (EBL): 10 mL           Implants:   Implant Name Type Inv. Item Serial No.  Lot No. LRB No. Used Action   ANCHOR SUTURE SWIVILOK 19.1MM - [id number] [employer] ANCHOR SUTURE SWIVILOK 19.1MM  [employer] [id number] Right 1 Implanted   ANCHOR SUTURETAK 3X12.7MM #2 - [id number] [employer] ANCHOR SUTURETAK 3X12.7MM #2  [employer] [id number] Right 1 Implanted   ANCHOR SUT BIOCOM 5.5X19.1MM - [id number] [employer] ANCHOR SUT BIOCOM 5.5X19.1MM  [employer] [id number] Right 2 Implanted   ANCHOR SUTURE SWIVILOK 19.1MM - [id number] [employer] ANCHOR SUTURE SWIVILOK 19.1MM  [employer] [id number] Right 1 Implanted   ANCHOR SUTURE SWIVILOK 19.1MM - [id number] [employer] ANCHOR SUTURE SWIVILOK 19.1MM  [employer] [id number] Right 1 Implanted       Specimens:   Specimen (24h ago, onward)      None                    Condition: Good    Disposition: PACU -  hemodynamically stable.    Attestation: I was present and scrubbed for the entire procedure.    Discharge Note    OUTCOME: Patient tolerated treatment/procedure well without complication and is now ready for discharge.    DISPOSITION: Home or Self Care    FINAL DIAGNOSIS:  <principal problem not specified>    FOLLOWUP: In clinic    DISCHARGE INSTRUCTIONS:    Discharge Procedure Orders   Diet general     Call MD for:  temperature >100.4     Call MD for:  persistent nausea and vomiting     Call MD for:  severe uncontrolled pain     Call MD for:  difficulty breathing, headache or visual disturbances     Call MD for:  redness, tenderness, or signs of infection (pain, swelling, redness, odor or green/yellow discharge around incision site)     Call MD for:  hives     Call MD for:  persistent dizziness or light-headedness     Call MD for:  extreme fatigue     Leave dressing on - Keep it clean, dry, and intact until clinic visit

## 2024-11-20 NOTE — TRANSFER OF CARE
"Anesthesia Transfer of Care Note    Patient: Jordan Gandara    Procedure(s) Performed: Procedure(s) (LRB):  REPAIR, ROTATOR CUFF, ARTHROSCOPIC (Right)  ARTHROSCOPY, SHOULDER, WITH SUPERIOR CAPSULAR RECONSTRUCTION (Right)    Patient location: PACU    Anesthesia Type: general    Transport from OR: Transported from OR on room air with adequate spontaneous ventilation    Post pain: adequate analgesia    Post assessment: no apparent anesthetic complications    Post vital signs: stable    Level of consciousness: sedated    Nausea/Vomiting: no nausea/vomiting    Complications: none    Transfer of care protocol was followed      Last vitals: Visit Vitals  /76 (BP Location: Right arm, Patient Position: Lying)   Pulse 82   Temp 36.5 °C (97.7 °F) (Oral)   Resp 18   Ht 5' 4" (1.626 m)   Wt 108 kg (238 lb 1.6 oz)   LMP 11/13/2024   SpO2 (!) 93%   Breastfeeding No   BMI 40.87 kg/m²     "

## (undated) DEVICE — GLOVE SENSICARE PI SURG 6.5

## (undated) DEVICE — SPONGE GAUZE 4X4 12 PLY STRL

## (undated) DEVICE — SOL NACL IRR 3000ML

## (undated) DEVICE — KIT TRIMANO CHIN

## (undated) DEVICE — DRAPE SPLIT ADHESIVE 77X120IN

## (undated) DEVICE — DRAPE U SPLIT SHEET 54X76IN

## (undated) DEVICE — SYR 10CC LUER LOCK

## (undated) DEVICE — DRAPE STERI U-SHAPED 47X51IN

## (undated) DEVICE — TUBE SET INFLOW/OUTFLOW

## (undated) DEVICE — BLADE SURG CARBON STEEL #10

## (undated) DEVICE — PUMP COLD THERAPY

## (undated) DEVICE — PAD SHOULDER CARE POLAR

## (undated) DEVICE — GLOVE SENSICARE PI GRN 7

## (undated) DEVICE — DRESSING N ADH OIL EMUL 3X3

## (undated) DEVICE — SUT VICRYL 2-0 SH VCP317H

## (undated) DEVICE — SPONGE COTTON TRAY 4X4IN

## (undated) DEVICE — CANNULA PASSPORT 8 MM X 4CM.

## (undated) DEVICE — GOWN POLY REINF BRTH SLV XL

## (undated) DEVICE — SUPPORT ULNA NERVE PROTECTOR

## (undated) DEVICE — CUBE COLD THERAPY POLAR PAD

## (undated) DEVICE — BLADE GREAT WHITE 4.2 6/BX

## (undated) DEVICE — TUBING MEDI-VAC 6FT 0.29IN

## (undated) DEVICE — NDL BLUNT FILL 18G 1IN

## (undated) DEVICE — POSITIONER HEEL FOAM CONVOLTD

## (undated) DEVICE — NDL SAFETY 22G X 1.5 ECLIPSE

## (undated) DEVICE — HOLDER LAP-KIT LAPSCP INSTR

## (undated) DEVICE — Device

## (undated) DEVICE — SUT 3-0 ETHILON 18 FS-1

## (undated) DEVICE — KIT PINK PAD EXT BODY STRP SHT

## (undated) DEVICE — SUPPORT SLING SHOT II MEDIUM

## (undated) DEVICE — SUT 2-0 ETHILON 18 FS

## (undated) DEVICE — KIT TRIMANO

## (undated) DEVICE — SYR ONLY LUER LOCK 20CC

## (undated) DEVICE — SUT ETHILON 2-0 FS 18IN BLK

## (undated) DEVICE — SEE MEDLINE ITEM 157216

## (undated) DEVICE — NDL SCORPION HD MEGALOADER

## (undated) DEVICE — SET APEX ARTHSCP TUBE 1 CONN

## (undated) DEVICE — BLADE SURG CARBON STEEL SZ11

## (undated) DEVICE — CUBE COLD THERAPY POLAR CARE

## (undated) DEVICE — BLANKET THERMOFLECT 4X7

## (undated) DEVICE — GLOVE PROTEXIS HYDROGEL SZ7.5

## (undated) DEVICE — SUT FIBERLINK TAPE BLU WHT 1.3

## (undated) DEVICE — DRAPE INSTR MAGNETIC 10X16IN

## (undated) DEVICE — BANDAGE ACE DOUBLE STER 6IN

## (undated) DEVICE — GLOVE PROTEXIS BLUE LATEX 7

## (undated) DEVICE — DURAPREP SURG SCRUB 26ML

## (undated) DEVICE — WRAP COBAN NL STRL 4INX5YD

## (undated) DEVICE — NDL FLTR 5MCRN BLNT TIP 18GX1

## (undated) DEVICE — SUT CTD VICRYL VIL BR SH 27

## (undated) DEVICE — BLADE VORTEX SHAVER 4.5MMX13CM

## (undated) DEVICE — GLOVE PROTEXIS HYDROGEL SZ6.5

## (undated) DEVICE — PAD DERMAPROX THCK 11X15X1IN

## (undated) DEVICE — PENCIL ELECSURG ROCKER 15FT

## (undated) DEVICE — SLEEVE COMPR KNEE LENGTH MED

## (undated) DEVICE — PAD ABDOMINAL STERILE 8X10IN

## (undated) DEVICE — TAPE ADH MEDIPORE 4 X 10YDS

## (undated) DEVICE — PROBE ARTHO ENERGY 90 DEG

## (undated) DEVICE — DRAPE ARTHSCP T ORTHOMAX POUCH

## (undated) DEVICE — GLOVE SENSICARE PI GRN 7.5

## (undated) DEVICE — BLANKET WARMING LOWER BODY

## (undated) DEVICE — DRESSING XEROFORM 1X8IN

## (undated) DEVICE — CUFF ATS 2 PORT SNGL BLDR 34IN

## (undated) DEVICE — GLOVE SENSICARE NEOPRENE 6.5

## (undated) DEVICE — PAD ELECTROSURGICAL SPL W/CORD

## (undated) DEVICE — BNDG COFLEX FOAM LF2 ST 4X5YD

## (undated) DEVICE — BANDAGE ESMARK ELASTIC ST 6X9

## (undated) DEVICE — GLOVE PROTEXIS BLUE LATEX 6.5

## (undated) DEVICE — PAD ABD 8X10 STERILE

## (undated) DEVICE — NDL ECLIPSE SAFETY 23G 1.5IN

## (undated) DEVICE — STAPLER SKIN ROTATING HEAD

## (undated) DEVICE — GLOVE SENSICARE PI GRN 8

## (undated) DEVICE — TUBING MEDI-VAC 20FT 0.29IN

## (undated) DEVICE — GLOVE PROTEXIS BLUE LATEX 7.5

## (undated) DEVICE — GLOVE SIGNATURE ESSNTL LTX 8

## (undated) DEVICE — SYR 3CC LUER LOC

## (undated) DEVICE — NDL SPINAL 18GX3.5 SPINOCAN

## (undated) DEVICE — NDL MAGELLAN SAFETY 18G 1.5IN

## (undated) DEVICE — NDL SAFETY STD LUER 18GX1.5IN